# Patient Record
Sex: MALE | Race: WHITE | Employment: OTHER | URBAN - METROPOLITAN AREA
[De-identification: names, ages, dates, MRNs, and addresses within clinical notes are randomized per-mention and may not be internally consistent; named-entity substitution may affect disease eponyms.]

---

## 2022-05-18 ENCOUNTER — NURSING HOME VISIT (OUTPATIENT)
Dept: GERIATRICS | Facility: CLINIC | Age: 77
End: 2022-05-18
Payer: COMMERCIAL

## 2022-05-18 VITALS
HEART RATE: 83 BPM | SYSTOLIC BLOOD PRESSURE: 119 MMHG | RESPIRATION RATE: 20 BRPM | HEIGHT: 69 IN | WEIGHT: 184.4 LBS | BODY MASS INDEX: 27.31 KG/M2 | OXYGEN SATURATION: 96 % | TEMPERATURE: 97.3 F | DIASTOLIC BLOOD PRESSURE: 82 MMHG

## 2022-05-18 DIAGNOSIS — E11.69 TYPE 2 DIABETES MELLITUS WITH OTHER SPECIFIED COMPLICATION, WITHOUT LONG-TERM CURRENT USE OF INSULIN (H): ICD-10-CM

## 2022-05-18 DIAGNOSIS — I73.9 PAD (PERIPHERAL ARTERY DISEASE) (H): ICD-10-CM

## 2022-05-18 DIAGNOSIS — K59.01 SLOW TRANSIT CONSTIPATION: ICD-10-CM

## 2022-05-18 DIAGNOSIS — G47.33 OSA (OBSTRUCTIVE SLEEP APNEA): ICD-10-CM

## 2022-05-18 DIAGNOSIS — F10.20 ALCOHOL USE DISORDER, MODERATE, DEPENDENCE (H): ICD-10-CM

## 2022-05-18 DIAGNOSIS — I10 PRIMARY HYPERTENSION: ICD-10-CM

## 2022-05-18 DIAGNOSIS — M48.062 SPINAL STENOSIS OF LUMBAR REGION WITH NEUROGENIC CLAUDICATION: ICD-10-CM

## 2022-05-18 DIAGNOSIS — F10.96 WERNICKE-KORSAKOFF SYNDROME (ALCOHOLIC) (H): Primary | ICD-10-CM

## 2022-05-18 DIAGNOSIS — N18.30 STAGE 3 CHRONIC KIDNEY DISEASE, UNSPECIFIED WHETHER STAGE 3A OR 3B CKD (H): ICD-10-CM

## 2022-05-18 DIAGNOSIS — F43.10 PTSD (POST-TRAUMATIC STRESS DISORDER): ICD-10-CM

## 2022-05-18 DIAGNOSIS — G63 POLYNEUROPATHY ASSOCIATED WITH UNDERLYING DISEASE (H): ICD-10-CM

## 2022-05-18 DIAGNOSIS — F33.9 RECURRENT MAJOR DEPRESSIVE DISORDER, REMISSION STATUS UNSPECIFIED (H): ICD-10-CM

## 2022-05-18 PROCEDURE — 99305 1ST NF CARE MODERATE MDM 35: CPT | Performed by: INTERNAL MEDICINE

## 2022-05-18 RX ORDER — GABAPENTIN 300 MG/1
300 CAPSULE ORAL 2 TIMES DAILY
COMMUNITY

## 2022-05-18 RX ORDER — LANOLIN ALCOHOL/MO/W.PET/CERES
3 CREAM (GRAM) TOPICAL AT BEDTIME
COMMUNITY

## 2022-05-18 RX ORDER — MIRTAZAPINE 15 MG/1
15 TABLET, FILM COATED ORAL AT BEDTIME
COMMUNITY
End: 2023-09-15

## 2022-05-18 RX ORDER — SENNOSIDES 8.6 MG
2 TABLET ORAL DAILY PRN
COMMUNITY

## 2022-05-18 RX ORDER — CAPSAICIN 0.025 %
CREAM (GRAM) TOPICAL 3 TIMES DAILY
COMMUNITY
End: 2023-03-01

## 2022-05-18 RX ORDER — TRIAMCINOLONE ACETONIDE 1 MG/G
CREAM TOPICAL 2 TIMES DAILY
COMMUNITY

## 2022-05-18 RX ORDER — ASPIRIN 81 MG/1
81 TABLET ORAL DAILY
COMMUNITY

## 2022-05-18 RX ORDER — LANOLIN ALCOHOL/MO/W.PET/CERES
100 CREAM (GRAM) TOPICAL DAILY
COMMUNITY

## 2022-05-18 RX ORDER — LIDOCAINE 50 MG/G
2 PATCH TOPICAL EVERY 24 HOURS
COMMUNITY

## 2022-05-18 RX ORDER — LOSARTAN POTASSIUM 100 MG/1
100 TABLET ORAL DAILY
COMMUNITY

## 2022-05-18 RX ORDER — AMLODIPINE BESYLATE 2.5 MG/1
2.5 TABLET ORAL DAILY
COMMUNITY

## 2022-05-18 RX ORDER — ONDANSETRON 4 MG/1
4 TABLET, FILM COATED ORAL EVERY 6 HOURS PRN
COMMUNITY

## 2022-05-18 RX ORDER — VENLAFAXINE 50 MG/1
75 TABLET ORAL DAILY
COMMUNITY

## 2022-05-18 RX ORDER — ATORVASTATIN CALCIUM 40 MG/1
40 TABLET, FILM COATED ORAL AT BEDTIME
COMMUNITY
End: 2022-06-11

## 2022-05-18 RX ORDER — POLYETHYLENE GLYCOL 3350 17 G/17G
17 POWDER, FOR SOLUTION ORAL DAILY PRN
COMMUNITY

## 2022-05-18 NOTE — LETTER
5/18/2022        RE: Román Houser  [facility]  [address] [place] [address]        [facility] GERIATRICS  INITIAL VISIT NOTE  May 18, 2022    PRIMARY CARE PROVIDER AND CLINIC:  Lorie Cunningham [facility] [address] [facility] / [place]*    Chief Complaint   Patient presents with     [facility] [place] [facility]       HPI:    Román Houser is a 76 year old  (1945) male who was seen at [facility] on May 18, 2022 for an initial visit. Medical history is notable for HTN, DM II, PAD, PTSD, EtOH use disorder and infrarenal AAA among others. He was hospitalized at the [facility] from 4/22/22 to 5/5/22 where he presented with fairly abrupt onset of altered mental status. 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 He received IV thiamine and then changed to PO. PTA hydrochlorothiazide and glipizide were discontinued. He is new on gabapentin for peripheral neuropathy and a PPI for EtOH gastritis. Neuro recommended formal outpatient neuropsych testing. He was admitted to this facility for medical management and rehab and possibly LTC.     Today, Mr. Houser is seen in his room sitting up in bed. He said he's doing OK but he feels naked without his wallet and car keys in his pockets. He did not remember being in the hospital. He did not know why he is at [facility]. Talked about hospital stay, needing to get him stronger. He denies any aches or pains, no chest pain, no dyspnea, no abdominal pain. BIMS 5/15. No concerns today per discussion with nursing.     CODE STATUS:   CPR/Full code     ALLERGIES:   No Known Allergies    PAST MEDICAL HISTORY:   Past Medical History:   Diagnosis Date     AAA (abdominal aortic aneurysm) (H)      Bilateral carpal tunnel syndrome      Cataract      Depression      Epiretinal membrane      ETOH abuse      Hip pain      Psoriasis      PTSD  (post-traumatic stress disorder)      Sleep apnea        PAST SURGICAL HISTORY:   Past Surgical History:   Procedure Laterality Date     CE IOL LEFT EYE         FAMILY HISTORY:   Unable to review due to cognitive impairment. BIMS 5/15.     SOCIAL HISTORY:   Lives with a roommate     MEDICATIONS:  Post Discharge Medication Reconciliation Status: discharge medications reconciled and changed, per note/orders.   Current Outpatient Medications   Medication Sig Dispense Refill     amLODIPine (NORVASC) 2.5 MG tablet Take 2.5 mg by mouth daily       aspirin 81 MG EC tablet Take 81 mg by mouth daily       atorvastatin (LIPITOR) 40 MG tablet Take 40 mg by mouth At Bedtime       capsaicin (ZOSTRIX) 0.025 % external cream Apply topically 3 times daily APPLY TO LOWER BACK       diclofenac (VOLTAREN) 1 % topical gel Apply 2 g topically 4 times daily as needed for moderate pain APPLY TO BACK/LEGS       gabapentin (NEURONTIN) 300 MG capsule Take 300 mg by mouth 2 times daily       lidocaine (LIDODERM) 5 % patch Place 2 patches onto the skin every 24 hours To prevent lidocaine toxicity, patient should be patch free for 12 hrs daily. APPLY TO LOWER BACK       losartan (COZAAR) 100 MG tablet Take 100 mg by mouth daily       melatonin 3 MG tablet Take 3 mg by mouth At Bedtime       metFORMIN (GLUCOPHAGE) 1000 MG tablet Take 1,000 mg by mouth 2 times daily (with meals)       mirtazapine (REMERON) 15 MG tablet Take 15 mg by mouth At Bedtime       omeprazole (PRILOSEC) 20 MG DR capsule Take 20 mg by mouth 2 times daily       ondansetron (ZOFRAN) 4 MG tablet Take 4 mg by mouth every 6 hours as needed for nausea       polyethylene glycol (MIRALAX) 17 g packet Take 17 g by mouth daily as needed for constipation       sennosides (SENOKOT) 8.6 MG tablet Take 2 tablets by mouth 2 times daily as needed for constipation       thiamine (B-1) 100 MG tablet Take 100 mg by mouth daily       triamcinolone (KENALOG) 0.1 % external cream Apply topically  "2 times daily APPLY TO RIGHT KNEE       venlafaxine (EFFEXOR) 75 MG tablet Take 75 mg by mouth daily           ROS:  Unable to obtain due to cognitive impairment or aphasia. [facility] 5/15.     PHYSICAL EXAM:  /82   Pulse 83   Temp 97.3  F (36.3  C)   Resp 20   Ht 1.753 m (5' 9\")   Wt 83.6 kg (184 lb 6.4 oz)   SpO2 96%   BMI 27.23 kg/m     Gen: sitting up in bed, alert, cooperative and in no acute distress  Eyes: no scleral icterus, no conjunctival injection  Ears: good hearing acuity  Mouth: moist mucous membranes  Card: RRR, S1, S2, no murmurs  Resp: lungs clear to auscultation bilaterally, no crackles or wheezes   MSK: normal muscle tone, no LE edema  Neuro: CX II-XII grossly in tact; ROM in all four extremities grossly in tact  Psych: memory, judgement and insight impaired    LABORATORY/IMAGING DATA:  Reviewed as per Epic    ASSESSMENT/PLAN:    Werolenae-Korcornelius  EtOH Use Disorder  Clinical picture of anterograde amnesia plus imaging findings support the diagnosis. [facility] 5/15.   -- thiamine 100 m gdaily   [facility]    Et[facility] Induced Gastritis  GERD  New on a PPI with improvement in nausea  -- omeprazole 20 mg BID; can re-eval for daily in a few weeks    DM, Type II  Hgb A1c not available. Sugars not in PCC. Glipizide was discontinued during hospitalization.   -- metformin 1000 mg BID  -- should get a A1c with next labs     HTN  SBPs - no BP trend Hydrochlorothiazide was discontinued during hospitalization.   -- amlodipine 2.5 mg daily, losartan 100 mg daily   -- follow BPs and adjust medications as needed    Peripheral Neuropathy  Underlying DM and EtOH. New on gabapentin during hospitalization.     PAD  ABIs in 2017 with moderate disease  -- ASA 81 mg daily, atorvastatin 40 mg daily    Major Recurrent Depression  PTSD  Very pleasant today, quick with a smile   -- mirtazapine 15 mg at bedtime and venlafaxine 75 mg daily  -- supportive cares      Lumbar Spinal Stenosis w/ " Neurogenic Caludication  Chronic  -- capsaicin cream TID, diclofenac gel QID PRN, gabapentin 300 mg BID, lidoderm patch on q12h/off q12h,     Infrarenal AAA  [facility]. No details known  -- noted for clinical significance; if he stays long term should get more records on this    BETTY  By history. Not sure if he has a CPAP at home - [facility]  -- if possible to determine if he should have a CPAP    Slow Transit Constipation  -- Miralax 17g daily PRN and Senna 2 tabs BID PRN  -- adjust bowel regimen as needed      Electronically signed by:  Mona Davalos MD                          Sincerely,        Mona Davalos MD

## 2022-05-19 NOTE — PROGRESS NOTES
[facility] GERIATRICS  INITIAL VISIT NOTE  May 18, 2022    PRIMARY CARE PROVIDER AND CLINIC:  Lorie Cunningham [facility] [address] [facility] / [place]*    Chief Complaint   Patient presents with     \[facility] [place] [facility]\"" Care       HPI:    Román Houser is a 76 year old  (1945) male who was seen at [facility] on May 18, 2022 for an initial visit. Medical history is notable for HTN, DM II, PAD, PTSD, EtOH use disorder and infrarenal AAA among others. He was hospitalized at the [facility] from 4/22/22 to 5/5/22 where he presented with fairly abrupt onset of altered mental status. 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 He received IV thiamine and then changed to PO. PTA hydrochlorothiazide and glipizide were discontinued. He is new on gabapentin for peripheral neuropathy and a PPI for EtOH gastritis. Neuro recommended formal outpatient neuropsych testing. He was admitted to this facility for medical management and rehab and possibly LTC.     Today, Mr. Houser is seen in his room sitting up in bed. He said he's doing OK but he feels naked without his wallet and car keys in his pockets. He did not remember being in the hospital. He did not know why he is at [facility]. Talked about hospital stay, needing to get him stronger. He denies any aches or pains, no chest pain, no dyspnea, no abdominal pain. BIMS 5/15. No concerns today per discussion with nursing.     CODE STATUS:   CPR/Full code     ALLERGIES:   No Known Allergies    PAST MEDICAL HISTORY:   Past Medical History:   Diagnosis Date     AAA (abdominal aortic aneurysm) (H)      Bilateral carpal tunnel syndrome      Cataract      Depression      Epiretinal membrane      ETOH abuse      Hip pain      Psoriasis      PTSD (post-traumatic stress disorder)      Sleep apnea        PAST SURGICAL HISTORY:   Past Surgical History:    Procedure Laterality Date     CE IOL LEFT EYE         FAMILY HISTORY:   Unable to review due to cognitive impairment. BIMS 5/15.     SOCIAL HISTORY:   Lives with a roommate     MEDICATIONS:  Post Discharge Medication Reconciliation Status: discharge medications reconciled and changed, per note/orders.   Current Outpatient Medications   Medication Sig Dispense Refill     amLODIPine (NORVASC) 2.5 MG tablet Take 2.5 mg by mouth daily       aspirin 81 MG EC tablet Take 81 mg by mouth daily       atorvastatin (LIPITOR) 40 MG tablet Take 40 mg by mouth At Bedtime       capsaicin (ZOSTRIX) 0.025 % external cream Apply topically 3 times daily APPLY TO LOWER BACK       diclofenac (VOLTAREN) 1 % topical gel Apply 2 g topically 4 times daily as needed for moderate pain APPLY TO BACK/LEGS       gabapentin (NEURONTIN) 300 MG capsule Take 300 mg by mouth 2 times daily       lidocaine (LIDODERM) 5 % patch Place 2 patches onto the skin every 24 hours To prevent lidocaine toxicity, patient should be patch free for 12 hrs daily. APPLY TO LOWER BACK       losartan (COZAAR) 100 MG tablet Take 100 mg by mouth daily       melatonin 3 MG tablet Take 3 mg by mouth At Bedtime       metFORMIN (GLUCOPHAGE) 1000 MG tablet Take 1,000 mg by mouth 2 times daily (with meals)       mirtazapine (REMERON) 15 MG tablet Take 15 mg by mouth At Bedtime       omeprazole (PRILOSEC) 20 MG DR capsule Take 20 mg by mouth 2 times daily       ondansetron (ZOFRAN) 4 MG tablet Take 4 mg by mouth every 6 hours as needed for nausea       polyethylene glycol (MIRALAX) 17 g packet Take 17 g by mouth daily as needed for constipation       sennosides (SENOKOT) 8.6 MG tablet Take 2 tablets by mouth 2 times daily as needed for constipation       thiamine (B-1) 100 MG tablet Take 100 mg by mouth daily       triamcinolone (KENALOG) 0.1 % external cream Apply topically 2 times daily APPLY TO RIGHT KNEE       venlafaxine (EFFEXOR) 75 MG tablet Take 75 mg by mouth daily    "        ROS:  Unable to obtain due to cognitive impairment or aphasia. [facility] 5/15.     PHYSICAL EXAM:  /82   Pulse 83   Temp 97.3  F (36.3  C)   Resp 20   Ht 1.753 m (5' 9\")   Wt 83.6 kg (184 lb 6.4 oz)   SpO2 96%   BMI 27.23 kg/m     Gen: sitting up in bed, alert, cooperative and in no acute distress  Eyes: no scleral icterus, no conjunctival injection  Ears: good hearing acuity  Mouth: moist mucous membranes  Card: RRR, S1, S2, no murmurs  Resp: lungs clear to auscultation bilaterally, no crackles or wheezes   MSK: normal muscle tone, no LE edema  Neuro: CX II-XII grossly in tact; ROM in all four extremities grossly in tact  Psych: memory, judgement and insight impaired    LABORATORY/IMAGING DATA:  Reviewed as per Epic    ASSESSMENT/PLAN:    Werolenae-Korsakogoldie  EtOH Use Disorder  Clinical picture of anterograde amnesia plus imaging findings support the diagnosis. [facility] 5/15.   -- thiamine 100 m gdaily   [facility]    EtOH Induced Gastritis  GERD  New on a PPI with improvement in nausea  -- omeprazole 20 mg BID; can re-eval for daily in a few weeks    DM, Type II  Hgb A1c not available. Sugars not in PCC. Glipizide was discontinued during hospitalization.   -- metformin 1000 mg BID  -- should get a A1c with next labs     HTN  SBPs - no BP trend Hydrochlorothiazide was discontinued during hospitalization.   -- amlodipine 2.5 mg daily, losartan 100 mg daily   -- follow BPs and adjust medications as needed    Peripheral Neuropathy  Underlying DM and EtOH. New on gabapentin during hospitalization.     PAD  ABIs in 2017 with moderate disease  -- ASA 81 mg daily, atorvastatin 40 mg daily    Major Recurrent Depression  PTSD  Very pleasant today, quick with a smile   -- mirtazapine 15 mg at bedtime and venlafaxine 75 mg daily  -- supportive cares      Lumbar Spinal Stenosis w/ Neurogenic Caludication  Chronic  -- capsaicin cream TID, diclofenac gel QID PRN, gabapentin 300 mg BID, " lidoderm patch on q12h/off q12h,     Infrarenal AAA  [facility]. No details known  -- noted for clinical significance; if he stays long term should get more records on this    BETTY  By history. Not sure if he has a CPAP at home - [facility]  -- if possible to determine if he should have a CPAP    Slow Transit Constipation  -- Miralax 17g daily PRN and Senna 2 tabs BID PRN  -- adjust bowel regimen as needed      Electronically signed by:  Mona Davalos MD

## 2022-06-03 ENCOUNTER — NURSING HOME VISIT (OUTPATIENT)
Dept: GERIATRICS | Facility: CLINIC | Age: 77
End: 2022-06-03
Payer: COMMERCIAL

## 2022-06-03 VITALS
SYSTOLIC BLOOD PRESSURE: 116 MMHG | TEMPERATURE: 97 F | DIASTOLIC BLOOD PRESSURE: 80 MMHG | WEIGHT: 183.4 LBS | BODY MASS INDEX: 27.16 KG/M2 | OXYGEN SATURATION: 97 % | RESPIRATION RATE: 18 BRPM | HEIGHT: 69 IN | HEART RATE: 80 BPM

## 2022-06-03 DIAGNOSIS — N18.30 STAGE 3 CHRONIC KIDNEY DISEASE, UNSPECIFIED WHETHER STAGE 3A OR 3B CKD (H): Primary | ICD-10-CM

## 2022-06-03 DIAGNOSIS — I10 PRIMARY HYPERTENSION: ICD-10-CM

## 2022-06-03 DIAGNOSIS — E11.69 TYPE 2 DIABETES MELLITUS WITH OTHER SPECIFIED COMPLICATION, WITHOUT LONG-TERM CURRENT USE OF INSULIN (H): ICD-10-CM

## 2022-06-03 DIAGNOSIS — F10.96 WERNICKE-KORSAKOFF SYNDROME (ALCOHOLIC) (H): ICD-10-CM

## 2022-06-03 DIAGNOSIS — F10.20 ALCOHOL USE DISORDER, MODERATE, DEPENDENCE (H): ICD-10-CM

## 2022-06-03 PROCEDURE — 99309 SBSQ NF CARE MODERATE MDM 30: CPT | Performed by: NURSE PRACTITIONER

## 2022-06-03 NOTE — PROGRESS NOTES
"[facility] GERIATRICS  Chief Complaint   Patient presents with     [facility] Regulatory     [place] Medical Record Number:  [id number]  Place of Service where encounter took place:  [facility] () [[id number]]    HPI:    Román Houser  is 76 year old (1945), who is being seen today for a federally mandated E/M visit. Today's concerns are:     Stage 3 chronic kidney disease, unspecified whether stage 3a or 3b CKD (H)  Type 2 diabetes mellitus with other specified complication, without long-term current use of insulin (H)  Wernicke-Korsakoff syndrome (alcoholic) (H)  Alcohol use disorder, moderate, dependence (H)  Primary hypertension     Patient seen today for a Regulatory visit in [facility]. Patient notes he's doing \"pretty good\". Would like to know when he will be able to \"get out of here\" - I tell him for now we need to focus and making sure he is healthy and strong - he is in agreement with this plan. Appetite is good. He is sleeping well. Denies CP, palpitations, fatigue, nausea, vomiting, increased SOB/HUANG, fever, chills, and/or b/b concerns today.    ALLERGIES:Patient has no known allergies.  PAST MEDICAL HISTORY:   Past Medical History:   Diagnosis Date     AAA (abdominal aortic aneurysm) (H)      Bilateral carpal tunnel syndrome      Cataract      Depression      Epiretinal membrane      ETOH abuse      Hip pain      Psoriasis      PTSD (post-traumatic stress disorder)      Sleep apnea      PAST SURGICAL HISTORY:   has a past surgical history that includes CE IOL LEFT EYE.  FAMILY HISTORY: family history is not on file.  SOCIAL HISTORY:  reports that he has quit smoking. He does not have any smokeless tobacco history on file.    MEDICATIONS:     Review of your medicines          Accurate as of Shira 3, 2022  1:18 PM. If you have any questions, ask your nurse or doctor.            CONTINUE these medicines which have NOT CHANGED      Dose / Directions   amLODIPine 2.5 MG tablet  Commonly " known as: NORVASC      Dose: 2.5 mg  Take 2.5 mg by mouth daily  Refills: 0     aspirin 81 MG EC tablet      Dose: 81 mg  Take 81 mg by mouth daily  Refills: 0     atorvastatin 40 MG tablet  Commonly known as: LIPITOR      Dose: 40 mg  Take 40 mg by mouth At Bedtime  Refills: 0     capsaicin 0.025 % external cream  Commonly known as: ZOSTRIX      Apply topically 3 times daily APPLY TO LOWER BACK  Refills: 0     diclofenac 1 % topical gel  Commonly known as: VOLTAREN      Dose: 2 g  Apply 2 g topically 4 times daily as needed for moderate pain APPLY TO BACK/LEGS  Refills: 0     gabapentin 300 MG capsule  Commonly known as: NEURONTIN      Dose: 300 mg  Take 300 mg by mouth 2 times daily  Refills: 0     lidocaine 5 % patch  Commonly known as: LIDODERM      Dose: 2 patch  Place 2 patches onto the skin every 24 hours To prevent lidocaine toxicity, patient should be patch free for 12 hrs daily. APPLY TO LOWER BACK  Refills: 0     losartan 100 MG tablet  Commonly known as: COZAAR      Dose: 100 mg  Take 100 mg by mouth daily  Refills: 0     melatonin 3 MG tablet      Dose: 3 mg  Take 3 mg by mouth At Bedtime  Refills: 0     metFORMIN 1000 MG tablet  Commonly known as: GLUCOPHAGE      Dose: 1,000 mg  Take 1,000 mg by mouth 2 times daily (with meals)  Refills: 0     mirtazapine 15 MG tablet  Commonly known as: REMERON      Dose: 15 mg  Take 15 mg by mouth At Bedtime  Refills: 0     omeprazole 20 MG DR capsule  Commonly known as: priLOSEC      Dose: 20 mg  Take 20 mg by mouth 2 times daily  Refills: 0     ondansetron 4 MG tablet  Commonly known as: ZOFRAN      Dose: 4 mg  Take 4 mg by mouth every 6 hours as needed for nausea  Refills: 0     polyethylene glycol 17 g packet  Commonly known as: MIRALAX      Dose: 17 g  Take 17 g by mouth daily as needed for constipation  Refills: 0     sennosides 8.6 MG tablet  Commonly known as: SENOKOT      Dose: 2 tablet  Take 2 tablets by mouth 2 times daily as needed for  "constipation  Refills: 0     thiamine 100 MG tablet  Commonly known as: B-1      Dose: 100 mg  Take 100 mg by mouth daily  Refills: 0     triamcinolone 0.1 % external cream  Commonly known as: KENALOG      Apply topically 2 times daily APPLY TO RIGHT KNEE  Refills: 0     venlafaxine 75 MG tablet  Commonly known as: EFFEXOR      Dose: 75 mg  Take 75 mg by mouth daily  Refills: 0           Case Management:  I have reviewed the care plan and MDS and do agree with the plan. Patient's desire to return to the community is present, but is not able due to care needs . Information reviewed:  Medications, vital signs, orders, and nursing notes.    ROS:  10 point ROS of systems including Constitutional, Eyes, Respiratory, Cardiovascular, Gastroenterology, Genitourinary, Integumentary, Musculoskeletal, Psychiatric were all negative except for pertinent positives noted in my HPI.    Vitals:  /80   Pulse 80   Temp 97  F (36.1  C)   Resp 18   Ht 1.753 m (5' 9\")   Wt 83.2 kg (183 lb 6.4 oz)   SpO2 97%   BMI 27.08 kg/m    Body mass index is 27.08 kg/m .  Exam:  GENERAL APPEARANCE:  Alert, in no distress, appears healthy, cooperative, elderly male ambulating on unit  EYES:  EOM, conjunctivae, lids, pupils and irises normal, wears glasses  RESP:  respiratory effort and palpation of chest normal, lungs clear to auscultation , no respiratory distress  CV:  Palpation and auscultation of heart done , regular rate and rhythm, no murmur, rub, or gallop  ABDOMEN:  normal bowel sounds, soft, nontender, no hepatosplenomegaly or other masses  M/S:   Gait and station normal  Digits and nails abnormal - arthritic changes present  SKIN:  Inspection of skin and subcutaneous tissue baseline, Palpation of skin and subcutaneous tissue baseline, skin thin, fragile and dry  NEURO:   Cranial nerves 2-12 are normal tested and grossly at patient's baseline  PSYCH:  oriented to 1-2, insight and judgement impaired, memory impaired , affect and " mood normal    Lab/Diagnostic data:   [facility]    ASSESSMENT/PLAN    ICD-10-CM    1. Stage 3 chronic kidney disease, unspecified whether stage 3a or 3b CKD (H)  N18.30 Chronic - unstable? Difficult to determine status given limitation on records. Will recheck CMP at this time and continue to avoid nephrotoxic agents.   2. Type 2 diabetes mellitus with other specified complication, without long-term current use of insulin (H)  E11.69 Chronic - stable? Suspect stability with discontinuation of Glipizide inpatient. Continues on regimen of Metformin. Will continue medications as ordered at this time with recheck of A1c.   3. Wernicke-Korsakoff syndrome (alcoholic) (H)  F10.96 Chronic - unstable. Notable decline PTA as noted in Dr Davalos's admission note. Continues on   4. Alcohol use disorder, moderate, dependence (H)  F10.20 Regimen of Thiamine, along with Remeron and Effexor. Last PHQ9 - 01/27; BIMs - 06/15. Is currently residing on secure MCU.  Will continue medications, placement and current POC at this time as they remain appropriate. Recheck CMP as noted above.     5. Primary hypertension  I10 Chronic - stable. Limited BPs as noted below. Continues on regimen of Losartan and Norvasc. Will continue medications as ordered with request for weekly VS as per facility protocol.       Baseline labs also needed:  CBC  FLP    Electronically signed by:  Dr. Racquel Cunningham, APRN, DNP, AGNP-BC, PMHNP-BC  [facility]  Office Hours: Tues-Fri 0930-8007  Office: [address] [place] [address]  Fax - [phone]  Triage Phone- [phone]  Business Office- [phone]      Email: [email or web address]

## 2022-06-09 ENCOUNTER — LAB REQUISITION (OUTPATIENT)
Dept: LAB | Facility: CLINIC | Age: 77
End: 2022-06-09

## 2022-06-09 DIAGNOSIS — N18.9 CHRONIC KIDNEY DISEASE, UNSPECIFIED: ICD-10-CM

## 2022-06-09 DIAGNOSIS — E11.9 TYPE 2 DIABETES MELLITUS WITHOUT COMPLICATIONS (H): ICD-10-CM

## 2022-06-09 DIAGNOSIS — E78.5 HYPERLIPIDEMIA, UNSPECIFIED: ICD-10-CM

## 2022-06-09 DIAGNOSIS — D64.9 ANEMIA, UNSPECIFIED: ICD-10-CM

## 2022-06-10 ENCOUNTER — TELEPHONE (OUTPATIENT)
Dept: GERIATRICS | Facility: CLINIC | Age: 77
End: 2022-06-10

## 2022-06-10 LAB
ALBUMIN SERPL-MCNC: 4 G/DL (ref 3.5–5)
ALP SERPL-CCNC: 103 U/L (ref 45–120)
ALT SERPL W P-5'-P-CCNC: 31 U/L (ref 0–45)
ANION GAP SERPL CALCULATED.3IONS-SCNC: 11 MMOL/L (ref 5–18)
AST SERPL W P-5'-P-CCNC: 20 U/L (ref 0–40)
BILIRUB SERPL-MCNC: 0.6 MG/DL (ref 0–1)
BUN SERPL-MCNC: 23 MG/DL (ref 8–28)
CALCIUM SERPL-MCNC: 10 MG/DL (ref 8.5–10.5)
CHLORIDE BLD-SCNC: 102 MMOL/L (ref 98–107)
CHOLEST SERPL-MCNC: 132 MG/DL
CO2 SERPL-SCNC: 27 MMOL/L (ref 22–31)
CREAT SERPL-MCNC: 1.14 MG/DL (ref 0.7–1.3)
ERYTHROCYTE [DISTWIDTH] IN BLOOD BY AUTOMATED COUNT: 13.2 % (ref 10–15)
FASTING STATUS PATIENT QL REPORTED: ABNORMAL
GFR SERPL CREATININE-BSD FRML MDRD: 66 ML/MIN/1.73M2
GLUCOSE BLD-MCNC: 133 MG/DL (ref 70–125)
HBA1C MFR BLD: 7.3 %
HCT VFR BLD AUTO: 45.4 % (ref 40–53)
HDLC SERPL-MCNC: 29 MG/DL
HGB BLD-MCNC: 14.6 G/DL (ref 13.3–17.7)
LDLC SERPL CALC-MCNC: 40 MG/DL
MCH RBC QN AUTO: 31.9 PG (ref 26.5–33)
MCHC RBC AUTO-ENTMCNC: 32.2 G/DL (ref 31.5–36.5)
MCV RBC AUTO: 99 FL (ref 78–100)
PLATELET # BLD AUTO: 265 10E3/UL (ref 150–450)
POTASSIUM BLD-SCNC: 4.7 MMOL/L (ref 3.5–5)
PROT SERPL-MCNC: 7.8 G/DL (ref 6–8)
RBC # BLD AUTO: 4.57 10E6/UL (ref 4.4–5.9)
SODIUM SERPL-SCNC: 140 MMOL/L (ref 136–145)
TRIGL SERPL-MCNC: 313 MG/DL
WBC # BLD AUTO: 7 10E3/UL (ref 4–11)

## 2022-06-10 PROCEDURE — P9604 ONE-WAY ALLOW PRORATED TRIP: HCPCS | Performed by: NURSE PRACTITIONER

## 2022-06-10 PROCEDURE — 80053 COMPREHEN METABOLIC PANEL: CPT | Performed by: NURSE PRACTITIONER

## 2022-06-10 PROCEDURE — 83036 HEMOGLOBIN GLYCOSYLATED A1C: CPT | Performed by: NURSE PRACTITIONER

## 2022-06-10 PROCEDURE — 85027 COMPLETE CBC AUTOMATED: CPT | Performed by: NURSE PRACTITIONER

## 2022-06-10 PROCEDURE — 36415 COLL VENOUS BLD VENIPUNCTURE: CPT | Performed by: NURSE PRACTITIONER

## 2022-06-10 PROCEDURE — 80061 LIPID PANEL: CPT | Performed by: NURSE PRACTITIONER

## 2022-06-10 PROCEDURE — 82040 ASSAY OF SERUM ALBUMIN: CPT | Performed by: NURSE PRACTITIONER

## 2022-06-10 NOTE — TELEPHONE ENCOUNTER
[facility] Note     Provider: LONG Childers DNP  Facility: [facility] Facility Type:  LTC    No Known Allergies    Labs Reviewed by provider: Heme 2, CMP, lipid, HgbA1c     Verbal Order/Direction given by Provider: No new orders.      Provider giving Order:  TYSHAWN Christian    Verbal Order given to: Andreas([phone])    Gomez Lowery RN

## 2022-06-11 DIAGNOSIS — E78.2 MIXED HYPERLIPIDEMIA: Primary | ICD-10-CM

## 2022-06-11 RX ORDER — ATORVASTATIN CALCIUM 20 MG/1
50 TABLET, FILM COATED ORAL AT BEDTIME
Qty: 78 TABLET | Refills: 2 | Status: SHIPPED | OUTPATIENT
Start: 2022-06-11 | End: 2023-01-23 | Stop reason: ALTCHOICE

## 2022-06-11 NOTE — PROGRESS NOTES
Orders:    Increase Atorvastatin to 50mg PO at bedtime     Recheck FLP on 7/22/22  o Dx HLD    Dr. Racquel Cunningham, APRN, DNP, AGNP-BC, PMHNP-BC  [facility]  Office Hours: Tues-Fri 2411-5031  Office: [address] [place] [address]  Fax - [phone]  Triage Phone- [phone]  Business Office- [phone]      Email: [email or web address]

## 2022-07-12 ENCOUNTER — NURSING HOME VISIT (OUTPATIENT)
Dept: GERIATRICS | Facility: CLINIC | Age: 77
End: 2022-07-12
Payer: COMMERCIAL

## 2022-07-12 VITALS
BODY MASS INDEX: 27.43 KG/M2 | OXYGEN SATURATION: 96 % | RESPIRATION RATE: 18 BRPM | WEIGHT: 185.2 LBS | TEMPERATURE: 97.6 F | DIASTOLIC BLOOD PRESSURE: 89 MMHG | HEART RATE: 82 BPM | HEIGHT: 69 IN | SYSTOLIC BLOOD PRESSURE: 163 MMHG

## 2022-07-12 DIAGNOSIS — F33.9 RECURRENT MAJOR DEPRESSIVE DISORDER, REMISSION STATUS UNSPECIFIED (H): ICD-10-CM

## 2022-07-12 DIAGNOSIS — I10 PRIMARY HYPERTENSION: ICD-10-CM

## 2022-07-12 DIAGNOSIS — F10.96 WERNICKE-KORSAKOFF SYNDROME (ALCOHOLIC) (H): ICD-10-CM

## 2022-07-12 DIAGNOSIS — E11.69 TYPE 2 DIABETES MELLITUS WITH OTHER SPECIFIED COMPLICATION, WITHOUT LONG-TERM CURRENT USE OF INSULIN (H): Primary | ICD-10-CM

## 2022-07-12 PROCEDURE — 99309 SBSQ NF CARE MODERATE MDM 30: CPT | Performed by: INTERNAL MEDICINE

## 2022-07-12 NOTE — PROGRESS NOTES
[facility] GERIATRICS  REGULATORY VISIT  July 12, 2022    [facility] Medical Record Number:  [id number]  Place of Service where encounter took place:  [facility] () [[id number]]    Chief Complaint   Patient presents with     intermediate Regulatory       HPI:    Román Houser is a 77 year old  (1945), who is being seen today for a federally mandated E/M visit at [facility] where he has resided since May 2022. HPI information obtained from: facility chart records, facility staff, patient report and [facility] chart review.    Today's concern is:  Today. Mr. Houser is seen prior to going outside for a group activity. He ambulates with a cane, though seems to use it little as gait looks pretty steady. [facility], but he doesn't know what for. No chest pain. No trouble breathing. No concerns today per discussion with nursing.     ALLERGIES:  No Known Allergies     Past Medical, Surgical, Family and Social History: Reviewed and updated in EPIC.    MEDICATIONS:  Current Outpatient Medications   Medication Sig Dispense Refill     amLODIPine (NORVASC) 2.5 MG tablet Take 2.5 mg by mouth daily       aspirin 81 MG EC tablet Take 81 mg by mouth daily       atorvastatin (LIPITOR) 20 MG tablet Take 2.5 tablets (50 mg) by mouth At Bedtime 78 tablet 2     capsaicin (ZOSTRIX) 0.025 % external cream Apply topically 3 times daily APPLY TO LOWER BACK       diclofenac (VOLTAREN) 1 % topical gel Apply 2 g topically 4 times daily as needed for moderate pain APPLY TO BACK/LEGS       gabapentin (NEURONTIN) 300 MG capsule Take 300 mg by mouth 2 times daily       lidocaine (LIDODERM) 5 % patch Place 2 patches onto the skin every 24 hours To prevent lidocaine toxicity, patient should be patch free for 12 hrs daily. APPLY TO LOWER BACK       losartan (COZAAR) 100 MG tablet Take 100 mg by mouth daily       melatonin 3 MG tablet Take 3 mg by mouth At Bedtime        "metFORMIN (GLUCOPHAGE) 1000 MG tablet Take 1,000 mg by mouth 2 times daily (with meals)       mirtazapine (REMERON) 15 MG tablet Take 15 mg by mouth At Bedtime       omeprazole (PRILOSEC) 20 MG DR capsule Take 20 mg by mouth 2 times daily       ondansetron (ZOFRAN) 4 MG tablet Take 4 mg by mouth every 6 hours as needed for nausea       polyethylene glycol (MIRALAX) 17 g packet Take 17 g by mouth daily as needed for constipation       sennosides (SENOKOT) 8.6 MG tablet Take 2 tablets by mouth 2 times daily as needed for constipation       thiamine (B-1) 100 MG tablet Take 100 mg by mouth daily       triamcinolone (KENALOG) 0.1 % external cream Apply topically 2 times daily APPLY TO RIGHT KNEE       venlafaxine (EFFEXOR) 75 MG tablet Take 75 mg by mouth daily       Medications reviewed:  Medications reconciled to facility chart and changes were made to reflect current medications as identified as above med list. Below are the changes that were made:   Medications stopped since last EPIC medication reconciliation:   There are no discontinued medications.    Medications started since last [facility] medication reconciliation:  No orders of the defined types were placed in this encounter.    REVIEW OF SYSTEMS:  Unable to be obtained due to cognitive impairment or aphasia. [facility] 8/15    PHYSICAL EXAM:  BP (!) 163/89   Pulse 82   Temp 97.6  F (36.4  C)   Resp 18   Ht 1.753 m (5' 9\")   Wt 84 kg (185 lb 3.2 oz)   SpO2 96%   BMI 27.35 kg/m    Gen: standing with his cane, alert, cooperative and in no acute distress  Resp: breathing non labored, no tachypnea  Ext: no LE edema  Neuro: CX II-XII grossly in tact; ROM in all four extremities grossly in tact  Psych: memory, judgement and insight impaired    LABS/IMAGING: Reviewed as per Epic and/or [facility]    ASSESSMENT / PLAN:    DM, Type II  Hgb A1c 7.3 in June. Goal <8. Sugars not in PCC.   -- metformin 1000 mg BID  -- annual A1c per goals of " care    WernikjermanEmily  EtOH Use Disorder  Major Recurrent Depression  Clinical picture of anterograde amnesia plus imaging findings support the diagnosis. [facility] 8/15.   -- thiamine 100 mg daily   -- mirtazapine 15 mg at bedtime and venlafaxine 75 mg daily  -- [facility]    HTN  SBPs 130s. Hydrochlorothiazide was discontinued during hospitalization this spring.  [facility] [place] in June.   -- amlodipine 2.5 mg daily, losartan 100 mg daily   -- follow BPs and adjust medications as needed    Electronically signed by  Mona Davalos MD

## 2022-07-12 NOTE — LETTER
7/12/2022        RE: Román Houser  [facility]  [address]  [facility] [address]        [facility] GERIATRICS  REGULATORY VISIT  July 12, 2022    [facility] Medical Record Number:  [id number]  Place of Service where encounter took place:  [facility] () [[id number]]    Chief Complaint   Patient presents with     intermediate Regulatory       HPI:    Román Houser is a 77 year old  (1945), who is being seen today for a federally mandated E/M visit at [facility] where he has resided since May 2022. HPI information obtained from: facility chart records, facility staff, patient report and [facility] chart review.    Today's concern is:  Today. Mr. Houser is seen prior to going outside for a group activity. He ambulates with a cane, though seems to use it little as gait looks pretty steady. [facility], but he doesn't know what for. No chest pain. No trouble breathing. No concerns today per discussion with nursing.     ALLERGIES:  No Known Allergies     Past Medical, Surgical, Family and Social History: Reviewed and updated in EPIC.    MEDICATIONS:  Current Outpatient Medications   Medication Sig Dispense Refill     amLODIPine (NORVASC) 2.5 MG tablet Take 2.5 mg by mouth daily       aspirin 81 MG EC tablet Take 81 mg by mouth daily       atorvastatin (LIPITOR) 20 MG tablet Take 2.5 tablets (50 mg) by mouth At Bedtime 78 tablet 2     capsaicin (ZOSTRIX) 0.025 % external cream Apply topically 3 times daily APPLY TO LOWER BACK       diclofenac (VOLTAREN) 1 % topical gel Apply 2 g topically 4 times daily as needed for moderate pain APPLY TO BACK/LEGS       gabapentin (NEURONTIN) 300 MG capsule Take 300 mg by mouth 2 times daily       lidocaine (LIDODERM) 5 % patch Place 2 patches onto the skin every 24 hours To prevent lidocaine toxicity, patient should be patch free for 12 hrs daily. APPLY TO LOWER BACK       losartan  "(COZAAR) 100 MG tablet Take 100 mg by mouth daily       melatonin 3 MG tablet Take 3 mg by mouth At Bedtime       metFORMIN (GLUCOPHAGE) 1000 MG tablet Take 1,000 mg by mouth 2 times daily (with meals)       mirtazapine (REMERON) 15 MG tablet Take 15 mg by mouth At Bedtime       omeprazole (PRILOSEC) 20 MG DR capsule Take 20 mg by mouth 2 times daily       ondansetron (ZOFRAN) 4 MG tablet Take 4 mg by mouth every 6 hours as needed for nausea       polyethylene glycol (MIRALAX) 17 g packet Take 17 g by mouth daily as needed for constipation       sennosides (SENOKOT) 8.6 MG tablet Take 2 tablets by mouth 2 times daily as needed for constipation       thiamine (B-1) 100 MG tablet Take 100 mg by mouth daily       triamcinolone (KENALOG) 0.1 % external cream Apply topically 2 times daily APPLY TO RIGHT KNEE       venlafaxine (EFFEXOR) 75 MG tablet Take 75 mg by mouth daily       Medications reviewed:  Medications reconciled to facility chart and changes were made to reflect current medications as identified as above med list. Below are the changes that were made:   Medications stopped since last EPIC medication reconciliation:   There are no discontinued medications.    Medications started since last [facility] medication reconciliation:  No orders of the defined types were placed in this encounter.    REVIEW OF SYSTEMS:  Unable to be obtained due to cognitive impairment or aphasia. [facility] 8/15    PHYSICAL EXAM:  BP (!) 163/89   Pulse 82   Temp 97.6  F (36.4  C)   Resp 18   Ht 1.753 m (5' 9\")   Wt 84 kg (185 lb 3.2 oz)   SpO2 96%   BMI 27.35 kg/m    Gen: standing with his cane, alert, cooperative and in no acute distress  Resp: breathing non labored, no tachypnea  Ext: no LE edema  Neuro: CX II-XII grossly in tact; ROM in all four extremities grossly in tact  Psych: memory, judgement and insight impaired    LABS/IMAGING: Reviewed as per Epic and/or [facility]    ASSESSMENT / PLAN:    DM, Type II  Hgb A1c 7.3 in June. " Goal <8. Sugars not in PCC.   -- metformin 1000 mg BID  -- annual A1c per goals of care    WerMoises  EtOH Use Disorder  Major Recurrent Depression  Clinical picture of anterograde amnesia plus imaging findings support the diagnosis. [facility] 8/15.   -- thiamine 100 mg daily   -- mirtazapine 15 mg at bedtime and venlafaxine 75 mg daily  -- [facility]    HTN  SBPs 130s. Hydrochlorothiazide was discontinued during hospitalization this spring.  [facility] [place] in June.   -- amlodipine 2.5 mg daily, losartan 100 mg daily   -- follow BPs and adjust medications as needed    Electronically signed by  Mona Davalos MD             Sincerely,        Mona Davalos MD

## 2022-07-16 PROBLEM — E11.9 DIABETES MELLITUS, TYPE 2 (H): Status: ACTIVE | Noted: 2022-07-16

## 2022-07-20 ENCOUNTER — LAB REQUISITION (OUTPATIENT)
Dept: LAB | Facility: CLINIC | Age: 77
End: 2022-07-20

## 2022-07-20 DIAGNOSIS — E78.5 HYPERLIPIDEMIA, UNSPECIFIED: ICD-10-CM

## 2022-07-22 LAB
CHOLEST SERPL-MCNC: 134 MG/DL
HDLC SERPL-MCNC: 30 MG/DL
LDLC SERPL CALC-MCNC: 49 MG/DL
NONHDLC SERPL-MCNC: 104 MG/DL
TRIGL SERPL-MCNC: 273 MG/DL

## 2022-07-22 PROCEDURE — P9604 ONE-WAY ALLOW PRORATED TRIP: HCPCS | Performed by: NURSE PRACTITIONER

## 2022-07-22 PROCEDURE — 36415 COLL VENOUS BLD VENIPUNCTURE: CPT | Performed by: NURSE PRACTITIONER

## 2022-07-22 PROCEDURE — 83718 ASSAY OF LIPOPROTEIN: CPT | Performed by: NURSE PRACTITIONER

## 2022-09-20 VITALS
RESPIRATION RATE: 18 BRPM | TEMPERATURE: 97.4 F | BODY MASS INDEX: 28.29 KG/M2 | WEIGHT: 191.6 LBS | HEART RATE: 88 BPM | OXYGEN SATURATION: 95 % | DIASTOLIC BLOOD PRESSURE: 78 MMHG | SYSTOLIC BLOOD PRESSURE: 134 MMHG

## 2022-09-20 RX ORDER — ACETAMINOPHEN 500 MG
1000 TABLET ORAL 2 TIMES DAILY
COMMUNITY

## 2022-09-20 NOTE — PROGRESS NOTES
[facility] GERIATRICS  Chief Complaint   Patient presents with     Annual Comprehensive Nursing Home     [facility] Medical Record Number:  [id number]  Place of Service where encounter took place:  No question data found.    HPI:    Román Houser  is a 77 year old  (1945), who is being seen today for an annual comprehensive visit.  He has with past medical history AAA, depression, alcohol abuse, PTSD, sleep apnea    Today is seen to review vital signs, labs, and a routine annual visit.  He denied chest pain shortness of breath cough congestion constipation or diarrhea.  Labs reviewed last CBC and BMP within normal limits last hemoglobin 14.6.  However his triglycerides are elevated at 273 which is slightly down from 313 prior he is on 50 mg of atorvastatin.  Last A1c 7.3 on 6/10/2022.  His weights were reviewed he has been stable over the last month.  He had no concerns. And denied pain    ALLERGIES: Patient has no known allergies.  PAST MEDICAL HISTORY:   Past Medical History:   Diagnosis Date     AAA (abdominal aortic aneurysm) (H)      Bilateral carpal tunnel syndrome      Cataract      Depression      Epiretinal membrane      ETOH abuse      Hip pain      Psoriasis      PTSD (post-traumatic stress disorder)      Sleep apnea       PAST SURGICAL HISTORY:  has a past surgical history that includes CE IOL LEFT EYE.  IMMUNIZATIONS:  Immunization History   Administered Date(s) Administered     COVID-19,PF,Pfizer (12+ Yrs) 02/24/2021, 03/15/2021, 10/01/2021     Influenza Vaccine IM > 6 months Valent IIV4 (Alfuria,Fluzone) 10/08/2020, 10/01/2021     Pneumo Conj 13-V (2010&after) 01/28/2016     Pneumococcal, Unspecified 11/08/2000, 10/14/2010     Tdap (Adacel,Boostrix) 01/21/2014, 05/04/2019     Zoster vaccine recombinant adjuvanted (SHINGRIX) 01/09/2019, 06/05/2019, 02/26/2020     Zoster vaccine, live 07/24/2012     Above immunizations pulled from [facility]. MIIC and facility records also  reconciled.Future immunizations are not needed at this point as all recommended immunizations are up to date.       Current Outpatient Medications:      acetaminophen (TYLENOL) 500 MG tablet, Take 1,000 mg by mouth 2 times daily, Disp: , Rfl:      amLODIPine (NORVASC) 2.5 MG tablet, Take 2.5 mg by mouth daily, Disp: , Rfl:      aspirin 81 MG EC tablet, Take 81 mg by mouth daily, Disp: , Rfl:      atorvastatin (LIPITOR) 20 MG tablet, Take 2.5 tablets (50 mg) by mouth At Bedtime, Disp: 78 tablet, Rfl: 2     capsaicin (ZOSTRIX) 0.025 % external cream, Apply topically 3 times daily APPLY TO LOWER BACK, Disp: , Rfl:      diclofenac (VOLTAREN) 1 % topical gel, Apply 2 g topically 4 times daily as needed for moderate pain APPLY TO BACK/LEGS, Disp: , Rfl:      gabapentin (NEURONTIN) 300 MG capsule, Take 300 mg by mouth 2 times daily, Disp: , Rfl:      lidocaine (LIDODERM) 5 % patch, Place 2 patches onto the skin every 24 hours To prevent lidocaine toxicity, patient should be patch free for 12 hrs daily. APPLY TO LOWER BACK, Disp: , Rfl:      losartan (COZAAR) 100 MG tablet, Take 100 mg by mouth daily, Disp: , Rfl:      melatonin 3 MG tablet, Take 3 mg by mouth At Bedtime, Disp: , Rfl:      metFORMIN (GLUCOPHAGE) 1000 MG tablet, Take 1,000 mg by mouth 2 times daily (with meals), Disp: , Rfl:      mirtazapine (REMERON) 15 MG tablet, Take 15 mg by mouth At Bedtime, Disp: , Rfl:      omeprazole (PRILOSEC) 20 MG DR capsule, Take 20 mg by mouth 2 times daily, Disp: , Rfl:      ondansetron (ZOFRAN) 4 MG tablet, Take 4 mg by mouth every 6 hours as needed for nausea, Disp: , Rfl:      polyethylene glycol (MIRALAX) 17 g packet, Take 17 g by mouth daily as needed for constipation, Disp: , Rfl:      sennosides (SENOKOT) 8.6 MG tablet, Take 2 tablets by mouth 2 times daily as needed for constipation, Disp: , Rfl:      thiamine (B-1) 100 MG tablet, Take 100 mg by mouth daily, Disp: , Rfl:      triamcinolone (KENALOG) 0.1 % external  cream, Apply topically 2 times daily APPLY TO RIGHT KNEE, Disp: , Rfl:      venlafaxine (EFFEXOR) 75 MG tablet, Take 75 mg by mouth daily, Disp: , Rfl:        Post Medication Reconciliation Status:          Case Management:  I have reviewed the facility/SNF care plan/MDS, including the falls risk, nutrition and pain screening. I also reviewed the current immunizations, and preventive care.. Future cancer screening is not clinically indicated secondary to age/goals of care Patient's desire to return to the community is not assessible due to cognitive impairment. Current Level of Care is appropriate.    Advance Directive Discussion:    I reviewed the current advanced directives as reflected in EPIC, the POLST and the facility chart, and verified the congruency of orders  CODE STATUS is full code  Team Discussion:  I communicated with the appropriate disciplines involved with the Plan of Care:   Nursing    Information reviewed:  Medications, vital signs, orders, and nursing notes.    ROS:  General: No change in weight, sleep or appetite.  Normal energy.  No fever or chills, no excessive fatigue or daytime drowsiness  Eyes: Negative for vision changes or eye problems  ENT: No problems with ears, nose or throat.  No difficulty swallowing.  Resp: No coughing, wheezing or shortness of breath, denies apnea  CV: No chest pains or palpitations  GI: No nausea, vomiting,  heartburn, abdominal pain, diarrhea, constipation or change in bowel habits  : No urinary frequency or dysuria, bladder or kidney problems  Musculoskeletal: No significant muscle or joint pains  Neurologic: No headaches, numbness, tingling, weakness, problems with balance or coordination  Skin: as above    Vitals:  /78   Pulse 88   Temp 97.4  F (36.3  C)   Resp 18   Wt 86.9 kg (191 lb 9.6 oz)   SpO2 95%   BMI 28.29 kg/m   Body mass index is 28.29 kg/m .  Exam:  Physical Exam:  Constitutional:       Appearance: Patient is well-developed.   HENT:       Head: Normocephalic.   Eyes:      Conjunctiva/sclera: Conjunctivae normal.   Neck:      Musculoskeletal: Normal range of motion.   Cardiovascular:      Rate and Rhythm: Normal rate and regular rhythm.      Heart sounds: Normal heart sounds. No murmur.   Pulmonary:      Effort: No respiratory distress.      Breath sounds: Normal breath sounds. No wheezing or rales.   Abdominal:      General: Bowel sounds are normal. There is no distension.      Palpations: Abdomen is soft.      Tenderness: There is no abdominal tenderness.   Musculoskeletal:       Normal range of motion.     Skin:General:        Skin is warm.   Neurological:         Mental Status: Patient is alert and oriented to person,  Psychiatric:         Behavior: Behavior normal    Lab/Diagnostic data:   No results found for this or any previous visit (from the past 240 hour(s)).    ASSESSMENT/PLAN  Pain management on scheduled Tylenol, capsaicin gabapentin, Voltaren gel, lidocaine patch    Type 2 diabetes A1c on 6/10/2022 was 7.3, continue metformin no current fingersticks    PTSD, on Effexor     Hypertension continue Norvasc, losartan    HDL on atorvastatin and aspirin,    Electronically signed by:  Rachel Donis CNP

## 2022-09-21 ENCOUNTER — NURSING HOME VISIT (OUTPATIENT)
Dept: GERIATRICS | Facility: CLINIC | Age: 77
End: 2022-09-21
Payer: COMMERCIAL

## 2022-09-21 DIAGNOSIS — E11.69 TYPE 2 DIABETES MELLITUS WITH OTHER SPECIFIED COMPLICATION, WITHOUT LONG-TERM CURRENT USE OF INSULIN (H): Primary | ICD-10-CM

## 2022-09-21 DIAGNOSIS — I10 PRIMARY HYPERTENSION: ICD-10-CM

## 2022-09-21 DIAGNOSIS — F33.9 RECURRENT MAJOR DEPRESSIVE DISORDER, REMISSION STATUS UNSPECIFIED (H): ICD-10-CM

## 2022-09-21 PROCEDURE — 99318 PR ANNUAL NURSING FAC ASSESSMNT, STABLE: CPT | Performed by: NURSE PRACTITIONER

## 2022-09-21 NOTE — LETTER
9/21/2022        RE: Román Houser  [facility]  [address]  [facility] [place] [facility] [address]        [facility] GERIATRICS  Chief Complaint   Patient presents with     Annual Comprehensive Nursing Home     [facility] Medical Record Number:  [id number]  Place of Service where encounter took place:  No question data found.    HPI:    Román Houser  is a 77 year old  (1945), who is being seen today for an annual comprehensive visit.  He has with past medical history AAA, depression, alcohol abuse, PTSD, sleep apnea    Today is seen to review vital signs, labs, and a routine annual visit.  He denied chest pain shortness of breath cough congestion constipation or diarrhea.  Labs reviewed last CBC and BMP within normal limits last hemoglobin 14.6.  However his triglycerides are elevated at 273 which is slightly down from 313 prior he is on 50 mg of atorvastatin.  Last A1c 7.3 on 6/10/2022.  His weights were reviewed he has been stable over the last month.  He had no concerns. And denied pain    ALLERGIES: Patient has no known allergies.  PAST MEDICAL HISTORY:   Past Medical History:   Diagnosis Date     AAA (abdominal aortic aneurysm) (H)      Bilateral carpal tunnel syndrome      Cataract      Depression      Epiretinal membrane      ETOH abuse      Hip pain      Psoriasis      PTSD (post-traumatic stress disorder)      Sleep apnea       PAST SURGICAL HISTORY:  has a past surgical history that includes CE IOL LEFT EYE.  IMMUNIZATIONS:  Immunization History   Administered Date(s) Administered     COVID-19,PF,Pfizer (12+ Yrs) 02/24/2021, 03/15/2021, 10/01/2021     Influenza Vaccine IM > 6 months Valent IIV4 (Alfuria,Fluzone) 10/08/2020, 10/01/2021     Pneumo Conj 13-V (2010&after) 01/28/2016     Pneumococcal, Unspecified 11/08/2000, 10/14/2010     Tdap (Adacel,Boostrix) 01/21/2014, 05/04/2019     Zoster vaccine recombinant adjuvanted (SHINGRIX) 01/09/2019, 06/05/2019, 02/26/2020     Zoster vaccine,  live 07/24/2012     Above immunizations pulled from [facility]. [facility] and facility records also reconciled.Future immunizations are not needed at this point as all recommended immunizations are up to date.       Current Outpatient Medications:      acetaminophen (TYLENOL) 500 MG tablet, Take 1,000 mg by mouth 2 times daily, Disp: , Rfl:      amLODIPine (NORVASC) 2.5 MG tablet, Take 2.5 mg by mouth daily, Disp: , Rfl:      aspirin 81 MG EC tablet, Take 81 mg by mouth daily, Disp: , Rfl:      atorvastatin (LIPITOR) 20 MG tablet, Take 2.5 tablets (50 mg) by mouth At Bedtime, Disp: 78 tablet, Rfl: 2     capsaicin (ZOSTRIX) 0.025 % external cream, Apply topically 3 times daily APPLY TO LOWER BACK, Disp: , Rfl:      diclofenac (VOLTAREN) 1 % topical gel, Apply 2 g topically 4 times daily as needed for moderate pain APPLY TO BACK/LEGS, Disp: , Rfl:      gabapentin (NEURONTIN) 300 MG capsule, Take 300 mg by mouth 2 times daily, Disp: , Rfl:      lidocaine (LIDODERM) 5 % patch, Place 2 patches onto the skin every 24 hours To prevent lidocaine toxicity, patient should be patch free for 12 hrs daily. APPLY TO LOWER BACK, Disp: , Rfl:      losartan (COZAAR) 100 MG tablet, Take 100 mg by mouth daily, Disp: , Rfl:      melatonin 3 MG tablet, Take 3 mg by mouth At Bedtime, Disp: , Rfl:      metFORMIN (GLUCOPHAGE) 1000 MG tablet, Take 1,000 mg by mouth 2 times daily (with meals), Disp: , Rfl:      mirtazapine (REMERON) 15 MG tablet, Take 15 mg by mouth At Bedtime, Disp: , Rfl:      omeprazole (PRILOSEC) 20 MG DR capsule, Take 20 mg by mouth 2 times daily, Disp: , Rfl:      ondansetron (ZOFRAN) 4 MG tablet, Take 4 mg by mouth every 6 hours as needed for nausea, Disp: , Rfl:      polyethylene glycol (MIRALAX) 17 g packet, Take 17 g by mouth daily as needed for constipation, Disp: , Rfl:      sennosides (SENOKOT) 8.6 MG tablet, Take 2 tablets by mouth 2 times daily as needed for constipation, Disp: , Rfl:      thiamine (B-1) 100 MG  tablet, Take 100 mg by mouth daily, Disp: , Rfl:      triamcinolone (KENALOG) 0.1 % external cream, Apply topically 2 times daily APPLY TO RIGHT KNEE, Disp: , Rfl:      venlafaxine (EFFEXOR) 75 MG tablet, Take 75 mg by mouth daily, Disp: , Rfl:        Post Medication Reconciliation Status:          Case Management:  I have reviewed the facility/SNF care plan/MDS, including the falls risk, nutrition and pain screening. I also reviewed the current immunizations, and preventive care.. Future cancer screening is not clinically indicated secondary to age/goals of care Patient's desire to return to the community is not assessible due to cognitive impairment. Current Level of Care is appropriate.    Advance Directive Discussion:    I reviewed the current advanced directives as reflected in EPIC, the POLST and the facility chart, and verified the congruency of orders  CODE STATUS is full code  Team Discussion:  I communicated with the appropriate disciplines involved with the Plan of Care:   Nursing    Information reviewed:  Medications, vital signs, orders, and nursing notes.    ROS:  General: No change in weight, sleep or appetite.  Normal energy.  No fever or chills, no excessive fatigue or daytime drowsiness  Eyes: Negative for vision changes or eye problems  ENT: No problems with ears, nose or throat.  No difficulty swallowing.  Resp: No coughing, wheezing or shortness of breath, denies apnea  CV: No chest pains or palpitations  GI: No nausea, vomiting,  heartburn, abdominal pain, diarrhea, constipation or change in bowel habits  : No urinary frequency or dysuria, bladder or kidney problems  Musculoskeletal: No significant muscle or joint pains  Neurologic: No headaches, numbness, tingling, weakness, problems with balance or coordination  Skin: as above    Vitals:  /78   Pulse 88   Temp 97.4  F (36.3  C)   Resp 18   Wt 86.9 kg (191 lb 9.6 oz)   SpO2 95%   BMI 28.29 kg/m   Body mass index is 28.29  kg/m .  Exam:  Physical Exam:  Constitutional:       Appearance: Patient is well-developed.   HENT:      Head: Normocephalic.   Eyes:      Conjunctiva/sclera: Conjunctivae normal.   Neck:      Musculoskeletal: Normal range of motion.   Cardiovascular:      Rate and Rhythm: Normal rate and regular rhythm.      Heart sounds: Normal heart sounds. No murmur.   Pulmonary:      Effort: No respiratory distress.      Breath sounds: Normal breath sounds. No wheezing or rales.   Abdominal:      General: Bowel sounds are normal. There is no distension.      Palpations: Abdomen is soft.      Tenderness: There is no abdominal tenderness.   Musculoskeletal:       Normal range of motion.     Skin:General:        Skin is warm.   Neurological:         Mental Status: Patient is alert and oriented to person,  Psychiatric:         Behavior: Behavior normal    Lab/Diagnostic data:   No results found for this or any previous visit (from the past 240 hour(s)).    ASSESSMENT/PLAN  Pain management on scheduled Tylenol, capsaicin gabapentin, Voltaren gel, lidocaine patch    Type 2 diabetes A1c on 6/10/2022 was 7.3, continue metformin no current fingersticks    PTSD, on Effexor     Hypertension continue Norvasc, losartan    HDL on atorvastatin and aspirin,    Electronically signed by:  Rachel Donis CNP           Sincerely,        Rachel Donis CNP

## 2022-10-18 ENCOUNTER — LAB REQUISITION (OUTPATIENT)
Dept: LAB | Facility: CLINIC | Age: 77
End: 2022-10-18

## 2022-10-18 DIAGNOSIS — E78.5 HYPERLIPIDEMIA, UNSPECIFIED: ICD-10-CM

## 2022-10-21 LAB
CHOLEST SERPL-MCNC: 145 MG/DL
HDLC SERPL-MCNC: 29 MG/DL
LDLC SERPL CALC-MCNC: 46 MG/DL
NONHDLC SERPL-MCNC: 116 MG/DL
TRIGL SERPL-MCNC: 348 MG/DL

## 2022-10-21 PROCEDURE — P9604 ONE-WAY ALLOW PRORATED TRIP: HCPCS | Performed by: NURSE PRACTITIONER

## 2022-10-21 PROCEDURE — 36415 COLL VENOUS BLD VENIPUNCTURE: CPT | Performed by: NURSE PRACTITIONER

## 2022-10-21 PROCEDURE — 80061 LIPID PANEL: CPT | Performed by: NURSE PRACTITIONER

## 2022-11-10 ENCOUNTER — NURSING HOME VISIT (OUTPATIENT)
Dept: GERIATRICS | Facility: CLINIC | Age: 77
End: 2022-11-10
Payer: COMMERCIAL

## 2022-11-10 VITALS
DIASTOLIC BLOOD PRESSURE: 97 MMHG | BODY MASS INDEX: 29.12 KG/M2 | HEART RATE: 99 BPM | WEIGHT: 196.6 LBS | SYSTOLIC BLOOD PRESSURE: 144 MMHG | RESPIRATION RATE: 18 BRPM | TEMPERATURE: 97.8 F | OXYGEN SATURATION: 96 % | HEIGHT: 69 IN

## 2022-11-10 DIAGNOSIS — E11.69 TYPE 2 DIABETES MELLITUS WITH OTHER SPECIFIED COMPLICATION, WITHOUT LONG-TERM CURRENT USE OF INSULIN (H): ICD-10-CM

## 2022-11-10 DIAGNOSIS — F33.9 RECURRENT MAJOR DEPRESSIVE DISORDER, REMISSION STATUS UNSPECIFIED (H): ICD-10-CM

## 2022-11-10 DIAGNOSIS — F04 WERNICKE-KORSAKOFF SYNDROME (H): ICD-10-CM

## 2022-11-10 DIAGNOSIS — I10 PRIMARY HYPERTENSION: Primary | ICD-10-CM

## 2022-11-10 PROCEDURE — 99309 SBSQ NF CARE MODERATE MDM 30: CPT | Performed by: INTERNAL MEDICINE

## 2022-11-10 NOTE — LETTER
"    11/10/2022        RE: Román Houser  [facility]  [address]  [facility] [address]        [facility] GERIATRICS  REGULATORY VISIT  November 10, 2022    [facility] Medical Record Number:  [id number]  Place of Service where encounter took place:  [facility] () []    Chief Complaint   Patient presents with     half-way Regulatory       HPI:    Román Houser is a 77 year old  (1945), who is being seen today for a federally mandated E/M visit at [facility] where he has resided since May 2022.  HPI information obtained from: facility chart records, facility staff, patient report, [facility] chart review and Care Everywhere [facility] chart review.    Today, Mr. Houser is seen sitting in a chair after breakfast. He is a limited historian due to dementia. He tells me he is doing \"just fine\" today. No aches or pains. [facility] or [facility] any upcoming [facility]. No concerns per discussion with nursing.       ALLERGIES:  No Known Allergies     Past Medical, Surgical, Family and Social History: Reviewed and updated in EPIC.    MEDICATIONS:  Current Outpatient Medications   Medication Sig Dispense Refill     acetaminophen (TYLENOL) 500 MG tablet Take 1,000 mg by mouth 2 times daily       amLODIPine (NORVASC) 2.5 MG tablet Take 2.5 mg by mouth daily       aspirin 81 MG EC tablet Take 81 mg by mouth daily       atorvastatin (LIPITOR) 20 MG tablet Take 2.5 tablets (50 mg) by mouth At Bedtime 78 tablet 2     capsaicin (ZOSTRIX) 0.025 % external cream Apply topically 3 times daily APPLY TO LOWER BACK       diclofenac (VOLTAREN) 1 % topical gel Apply 2 g topically 4 times daily as needed for moderate pain APPLY TO BACK/LEGS       gabapentin (NEURONTIN) 300 MG capsule Take 300 mg by mouth 2 times daily       lidocaine (LIDODERM) 5 % patch Place 2 patches onto the skin every 24 hours To prevent lidocaine toxicity, patient should be " "patch free for 12 hrs daily. APPLY TO LOWER BACK       losartan (COZAAR) 100 MG tablet Take 100 mg by mouth daily       melatonin 3 MG tablet Take 3 mg by mouth At Bedtime       metFORMIN (GLUCOPHAGE) 1000 MG tablet Take 1,000 mg by mouth 2 times daily (with meals)       mirtazapine (REMERON) 15 MG tablet Take 15 mg by mouth At Bedtime       omeprazole (PRILOSEC) 20 MG DR capsule Take 20 mg by mouth 2 times daily       ondansetron (ZOFRAN) 4 MG tablet Take 4 mg by mouth every 6 hours as needed for nausea       polyethylene glycol (MIRALAX) 17 g packet Take 17 g by mouth daily as needed for constipation       sennosides (SENOKOT) 8.6 MG tablet Take 2 tablets by mouth 2 times daily as needed for constipation       thiamine (B-1) 100 MG tablet Take 100 mg by mouth daily       triamcinolone (KENALOG) 0.1 % external cream Apply topically 2 times daily APPLY TO RIGHT KNEE       venlafaxine (EFFEXOR) 75 MG tablet Take 75 mg by mouth daily       Medications reviewed:  Medications reconciled to facility chart and changes were made to reflect current medications as identified as above med list. Below are the changes that were made:   Medications stopped since last EPIC medication reconciliation:   There are no discontinued medications.  Medications started since last [facility] medication reconciliation:  No orders of the defined types were placed in this encounter.      REVIEW OF SYSTEMS:  Unable to be obtained due to cognitive impairment or aphasia.     PHYSICAL EXAM:  BP (!) 144/97   Pulse 99   Temp 97.8  F (36.6  C)   Resp 18   Ht 1.753 m (5' 9\")   Wt 89.2 kg (196 lb 9.6 oz)   SpO2 96%   BMI 29.03 kg/m    Gen: sitting in chair, alert, cooperative and in no acute distress  Resp: breathing non labored, no tachypnea   Ext: no LE edema  Neuro: CX II-XII grossly in tact; ROM in all four extremities grossly in tact  Psych: memory, judgement and insight impaired      LABS/IMAGING: Reviewed as per Epic and/or " [facility]    ASSESSMENT / PLAN:    HTN  SBPs 130s-150s.   -- amlodipine 2.5 mg daily, losartan 100 mg daily   -- follow BPs and adjust medications as needed    Werolenae-Korcornelius  Major Recurrent Depression  Clinical picture of anterograde amnesia plus imaging findings support the diagnosis. BIMS 8/15.   -- thiamine 100 mg daily   -- mirtazapine 15 mg at bedtime and venlafaxine 75 mg daily  -- supportive cares     DM, Type II  Hgb A1c 7.3 in June   -- metformin 1000 mg BID  -- follow annual A1c     Electronically signed by  Mona Davalos MD                Sincerely,        Mona Davalos MD

## 2022-11-10 NOTE — PROGRESS NOTES
"[facility] GERIATRICS  REGULATORY VISIT  November 10, 2022    [facility] Medical Record Number:  [id number]  Place of Service where encounter took place:  [facility] () [[id number]]    Chief Complaint   Patient presents with     correction Regulatory       HPI:    Román Houser is a 77 year old  (1945), who is being seen today for a federally mandated E/M visit at [facility] where he has resided since May 2022.  HPI information obtained from: facility chart records, facility staff, patient report, [facility] chart review and Care Everywhere [facility] chart review.    Today, Mr. Houser is seen sitting in a chair after breakfast. He is a limited historian due to dementia. He tells me he is doing \"just fine\" today. No aches or pains. Isn't sure [facility] or has any upcoming appointments. No concerns per discussion with nursing.       ALLERGIES:  No Known Allergies     Past Medical, Surgical, Family and Social History: Reviewed and updated in EPIC.    MEDICATIONS:  Current Outpatient Medications   Medication Sig Dispense Refill     acetaminophen (TYLENOL) 500 MG tablet Take 1,000 mg by mouth 2 times daily       amLODIPine (NORVASC) 2.5 MG tablet Take 2.5 mg by mouth daily       aspirin 81 MG EC tablet Take 81 mg by mouth daily       atorvastatin (LIPITOR) 20 MG tablet Take 2.5 tablets (50 mg) by mouth At Bedtime 78 tablet 2     capsaicin (ZOSTRIX) 0.025 % external cream Apply topically 3 times daily APPLY TO LOWER BACK       diclofenac (VOLTAREN) 1 % topical gel Apply 2 g topically 4 times daily as needed for moderate pain APPLY TO BACK/LEGS       gabapentin (NEURONTIN) 300 MG capsule Take 300 mg by mouth 2 times daily       lidocaine (LIDODERM) 5 % patch Place 2 patches onto the skin every 24 hours To prevent lidocaine toxicity, patient should be patch free for 12 hrs daily. APPLY TO LOWER BACK       losartan (COZAAR) 100 MG tablet Take 100 mg by mouth daily   " "    melatonin 3 MG tablet Take 3 mg by mouth At Bedtime       metFORMIN (GLUCOPHAGE) 1000 MG tablet Take 1,000 mg by mouth 2 times daily (with meals)       mirtazapine (REMERON) 15 MG tablet Take 15 mg by mouth At Bedtime       omeprazole (PRILOSEC) 20 MG DR capsule Take 20 mg by mouth 2 times daily       ondansetron (ZOFRAN) 4 MG tablet Take 4 mg by mouth every 6 hours as needed for nausea       polyethylene glycol (MIRALAX) 17 g packet Take 17 g by mouth daily as needed for constipation       sennosides (SENOKOT) 8.6 MG tablet Take 2 tablets by mouth 2 times daily as needed for constipation       thiamine (B-1) 100 MG tablet Take 100 mg by mouth daily       triamcinolone (KENALOG) 0.1 % external cream Apply topically 2 times daily APPLY TO RIGHT KNEE       venlafaxine (EFFEXOR) 75 MG tablet Take 75 mg by mouth daily       Medications reviewed:  Medications reconciled to facility chart and changes were made to reflect current medications as identified as above med list. Below are the changes that were made:   Medications stopped since last EPIC medication reconciliation:   There are no discontinued medications.  Medications started since last [facility] medication reconciliation:  No orders of the defined types were placed in this encounter.      REVIEW OF SYSTEMS:  Unable to be obtained due to cognitive impairment or aphasia.     PHYSICAL EXAM:  BP (!) 144/97   Pulse 99   Temp 97.8  F (36.6  C)   Resp 18   Ht 1.753 m (5' 9\")   Wt 89.2 kg (196 lb 9.6 oz)   SpO2 96%   BMI 29.03 kg/m    Gen: sitting in chair, alert, cooperative and in no acute distress  Resp: breathing non labored, no tachypnea   Ext: no LE edema  Neuro: CX II-XII grossly in tact; ROM in all four extremities grossly in tact  Psych: memory, judgement and insight impaired      LABS/IMAGING: Reviewed as per Epic and/or [facility]    ASSESSMENT / PLAN:    HTN  SBPs 130s-150s.   -- amlodipine 2.5 mg daily, losartan 100 mg daily   -- follow BPs and " adjust medications as needed    Werolenae-Korsakogoldie  Major Recurrent Depression  Clinical picture of anterograde amnesia plus imaging findings support the diagnosis. BIMS 8/15.   -- thiamine 100 mg daily   -- mirtazapine 15 mg at bedtime and venlafaxine 75 mg daily  -- supportive cares     DM, Type II  Hgb A1c 7.3 in June   -- metformin 1000 mg BID  -- follow annual A1c     Electronically signed by  Mona Davalos MD

## 2023-01-20 ENCOUNTER — NURSING HOME VISIT (OUTPATIENT)
Dept: GERIATRICS | Facility: CLINIC | Age: 78
End: 2023-01-20
Payer: COMMERCIAL

## 2023-01-20 VITALS
DIASTOLIC BLOOD PRESSURE: 83 MMHG | RESPIRATION RATE: 16 BRPM | TEMPERATURE: 97.4 F | OXYGEN SATURATION: 95 % | WEIGHT: 194.8 LBS | BODY MASS INDEX: 28.85 KG/M2 | HEART RATE: 80 BPM | HEIGHT: 69 IN | SYSTOLIC BLOOD PRESSURE: 129 MMHG

## 2023-01-20 DIAGNOSIS — E11.69 TYPE 2 DIABETES MELLITUS WITH OTHER SPECIFIED COMPLICATION, WITHOUT LONG-TERM CURRENT USE OF INSULIN (H): ICD-10-CM

## 2023-01-20 DIAGNOSIS — I10 PRIMARY HYPERTENSION: Primary | ICD-10-CM

## 2023-01-20 DIAGNOSIS — E78.1 HIGH BLOOD TRIGLYCERIDES: ICD-10-CM

## 2023-01-20 PROCEDURE — 99309 SBSQ NF CARE MODERATE MDM 30: CPT | Performed by: NURSE PRACTITIONER

## 2023-01-20 NOTE — PROGRESS NOTES
[facility] GERIATRICS  Chief Complaint   Patient presents with     halfway Regulatory     [facility] Medical Record Number:  [id number]  Place of Service where encounter took place:  [facility] () [[id number]]    HPI:    Román Houser  is 77 year old (1945), who is being seen today for a federally mandated E/M visit. Today's concerns are:     Primary hypertension  High blood triglycerides  Type 2 diabetes mellitus with other specified complication, without long-term current use of insulin (H)     Patient seen today for a regulatory visit in LT. Patient notes he is doing well. He offers not acute concerns or complaints at today's visit. Staff with no concerns noted. Appetite is doing well. He is sleeping well. Denies CP, palpitations, fatigue, nausea, vomiting, increased SOB/HUANG, fever, chills, and/or b/b concerns today.    ALLERGIES:Patient has no known allergies.  PAST MEDICAL HISTORY:   Past Medical History:   Diagnosis Date     AAA (abdominal aortic aneurysm)      Bilateral carpal tunnel syndrome      Cataract      Depression      Epiretinal membrane      ETOH abuse      Hip pain      Psoriasis      PTSD (post-traumatic stress disorder)      Sleep apnea      PAST SURGICAL HISTORY:   has a past surgical history that includes CE IOL LEFT EYE.  FAMILY HISTORY: family history is not on file.  SOCIAL HISTORY:  reports that he has quit smoking. He does not have any smokeless tobacco history on file.    MEDICATIONS:  MED REC REQUIRED  Post Medication Reconciliation Status: patient was not discharged from an inpatient facility or TCU         Review of your medicines          Accurate as of January 20, 2023 11:59 PM. If you have any questions, ask your nurse or doctor.            CONTINUE these medicines which have NOT CHANGED      Dose / Directions   acetaminophen 500 MG tablet  Commonly known as: TYLENOL      Dose: 1,000 mg  Take 1,000 mg by mouth 2 times daily  Refills: 0     amLODIPine 2.5 MG  tablet  Commonly known as: NORVASC      Dose: 2.5 mg  Take 2.5 mg by mouth daily  Refills: 0     aspirin 81 MG EC tablet      Dose: 81 mg  Take 81 mg by mouth daily  Refills: 0     atorvastatin 20 MG tablet  Commonly known as: LIPITOR  Used for: Mixed hyperlipidemia      Dose: 50 mg  Take 2.5 tablets (50 mg) by mouth At Bedtime  Quantity: 78 tablet  Refills: 2     capsaicin 0.025 % external cream  Commonly known as: ZOSTRIX      Apply topically 3 times daily APPLY TO LOWER BACK  Refills: 0     diclofenac 1 % topical gel  Commonly known as: VOLTAREN      Dose: 2 g  Apply 2 g topically 4 times daily as needed for moderate pain APPLY TO BACK/LEGS  Refills: 0     gabapentin 300 MG capsule  Commonly known as: NEURONTIN      Dose: 300 mg  Take 300 mg by mouth 2 times daily  Refills: 0     lidocaine 5 % patch  Commonly known as: LIDODERM      Dose: 2 patch  Place 2 patches onto the skin every 24 hours To prevent lidocaine toxicity, patient should be patch free for 12 hrs daily. APPLY TO LOWER BACK  Refills: 0     losartan 100 MG tablet  Commonly known as: COZAAR      Dose: 100 mg  Take 100 mg by mouth daily  Refills: 0     melatonin 3 MG tablet      Dose: 3 mg  Take 3 mg by mouth At Bedtime  Refills: 0     metFORMIN 1000 MG tablet  Commonly known as: GLUCOPHAGE      Dose: 1,000 mg  Take 1,000 mg by mouth 2 times daily (with meals)  Refills: 0     mirtazapine 15 MG tablet  Commonly known as: REMERON      Dose: 15 mg  Take 15 mg by mouth At Bedtime  Refills: 0     omeprazole 20 MG DR capsule  Commonly known as: priLOSEC      Dose: 20 mg  Take 20 mg by mouth 2 times daily  Refills: 0     ondansetron 4 MG tablet  Commonly known as: ZOFRAN      Dose: 4 mg  Take 4 mg by mouth every 6 hours as needed for nausea  Refills: 0     polyethylene glycol 17 g packet  Commonly known as: MIRALAX      Dose: 17 g  Take 17 g by mouth daily as needed for constipation  Refills: 0     sennosides 8.6 MG tablet  Commonly known as: SENOKOT       "Dose: 2 tablet  Take 2 tablets by mouth 2 times daily as needed for constipation  Refills: 0     thiamine 100 MG tablet  Commonly known as: B-1      Dose: 100 mg  Take 100 mg by mouth daily  Refills: 0     triamcinolone 0.1 % external cream  Commonly known as: KENALOG      Apply topically 2 times daily APPLY TO RIGHT KNEE  Refills: 0     venlafaxine 75 MG tablet  Commonly known as: EFFEXOR      Dose: 75 mg  Take 75 mg by mouth daily  Refills: 0             Case Management:  I have reviewed the care plan and MDS and do agree with the plan. Patient's desire to return to the community is present, but is not able due to care needs . Information reviewed:  Medications, vital signs, orders, and nursing notes.    ROS:  10 point ROS of systems including Constitutional, Eyes, Respiratory, Cardiovascular, Gastroenterology, Genitourinary, Integumentary, Musculoskeletal, Psychiatric were all negative except for pertinent positives noted in my HPI.    Vitals:  /83   Pulse 80   Temp 97.4  F (36.3  C)   Resp 16   Ht 1.753 m (5' 9\")   Wt 88.4 kg (194 lb 12.8 oz)   SpO2 95%   BMI 28.77 kg/m    Body mass index is 28.77 kg/m .  Exam:  GENERAL APPEARANCE:  Alert, in no distress, appears healthy, cooperative, elderly male sitting in chair in day room  EYES:  EOM, conjunctivae, lids, pupils and irises normal, wears glasses  RESP: Respiratory effort and palpation of chest normal, lungs clear to auscultation, no respiratory distress  CV:  Palpation and auscultation of heart done, regular rate and rhythm, no murmur, rub, or gallop  ABDOMEN:  normal bowel sounds, soft, nontender, no hepatosplenomegaly or other masses  M/S:   Gait and station - RADHA 2/2 patient sitting in chair; Digits and nails abnormal - arthritic changes present  SKIN:  Inspection of skin and subcutaneous tissue baseline, Palpation of skin and subcutaneous tissue baseline, skin thin, fragile and dry  NEURO:   Cranial nerves 2-12 are normal tested and grossly at " patient's baseline  PSYCH:  oriented to 1-2, insight and judgement impaired, memory impaired, affect and mood normal    Lab/Diagnostic data:   Recent labs in [facility] reviewed by me today.   CBC RESULTS: Recent Labs   Lab Test 06/10/22  0824   WBC 7.0   RBC 4.57   HGB 14.6   HCT 45.4   MCV 99   MCH 31.9   MCHC 32.2   RDW 13.2          Last Basic Metabolic Panel:  Recent Labs   Lab Test 06/10/22  0824      POTASSIUM 4.7   CHLORIDE 102   MARIA ISABEL 10.0   CO2 27   BUN 23   CR 1.14   *       Liver Function Studies -   Recent Labs   Lab Test 06/10/22  0824   PROTTOTAL 7.8   ALBUMIN 4.0   BILITOTAL 0.6   ALKPHOS 103   AST 20   ALT 31     Lab Results   Component Value Date    A1C 7.3 06/10/2022       ASSESSMENT/PLAN    ICD-10-CM    1. Primary hypertension  I10 Chronic - variable. Recent BPs shown below; goal given age and comorbid conditions <140/90 which he is at this time. Will continue current regimen of Norvasc and Losartan as ordered with ongoing monitoring of VS per facility protocol.        2. High blood triglycerides  E78.1 Chronic - unstable. Last FLP as shown below. Will continue Atorvastatin as ordered with recheck of FLP at this time.      3. Type 2 diabetes mellitus with other specified complication, without long-term current use of insulin (H)  E11.69 Chronic - stable. No current BG monitoring - Last A1c as noted above. Will continue current regimen of Metformin and recheck A1c at this time.        Electronically signed by:  Dr. Racquel Cunningham, APRN, DNP, AGNP-BC, PMHNP-BC  [employer] [facility]  Office Hours: Tues-Fri 7713-8981  Office: [address] [place] [address]  Fax - [phone]  Triage Phone - [phone]  Business Office- [phone]      Email: [email or web address]

## 2023-01-20 NOTE — LETTER
1/20/2023        RE: Román Houser  [facility]  [address]  [facility] [address]        [facility] GERIATRICS  Chief Complaint   Patient presents with     [facility] Regulatory     [facility] Medical Record Number:  [id number]  Place of Service where encounter took place:  [facility] () [[id number]]    HPI:    Román Houser  is 77 year old (1945), who is being seen today for a federally mandated E/M visit. Today's concerns are:     Primary hypertension  High blood triglycerides  Type 2 diabetes mellitus with other specified complication, without long-term current use of insulin (H)     Patient seen today for a regulatory visit in [facility]. Patient notes he is doing well. He offers not acute concerns or complaints at today's visit. Staff with no concerns noted. Appetite is doing well. He is sleeping well. Denies CP, palpitations, fatigue, nausea, vomiting, increased SOB/HUANG, fever, chills, and/or b/b concerns today.    ALLERGIES:Patient has no known allergies.  PAST MEDICAL HISTORY:   Past Medical History:   Diagnosis Date     AAA (abdominal aortic aneurysm)      Bilateral carpal tunnel syndrome      Cataract      Depression      Epiretinal membrane      ETOH abuse      Hip pain      Psoriasis      PTSD (post-traumatic stress disorder)      Sleep apnea      PAST SURGICAL HISTORY:   has a past surgical history that includes CE IOL LEFT EYE.  FAMILY HISTORY: family history is not on file.  SOCIAL HISTORY:  reports that he has quit smoking. He does not have any smokeless tobacco history on file.    MEDICATIONS:  MED REC REQUIRED  Post Medication Reconciliation Status: patient was not discharged from an inpatient facility or TCU         Review of your medicines          Accurate as of January 20, 2023 11:59 PM. If you have any questions, ask your nurse or doctor.            CONTINUE these medicines which have NOT CHANGED      Dose / Directions   acetaminophen 500 MG  tablet  Commonly known as: TYLENOL      Dose: 1,000 mg  Take 1,000 mg by mouth 2 times daily  Refills: 0     amLODIPine 2.5 MG tablet  Commonly known as: NORVASC      Dose: 2.5 mg  Take 2.5 mg by mouth daily  Refills: 0     aspirin 81 MG EC tablet      Dose: 81 mg  Take 81 mg by mouth daily  Refills: 0     atorvastatin 20 MG tablet  Commonly known as: LIPITOR  Used for: Mixed hyperlipidemia      Dose: 50 mg  Take 2.5 tablets (50 mg) by mouth At Bedtime  Quantity: 78 tablet  Refills: 2     capsaicin 0.025 % external cream  Commonly known as: ZOSTRIX      Apply topically 3 times daily APPLY TO LOWER BACK  Refills: 0     diclofenac 1 % topical gel  Commonly known as: VOLTAREN      Dose: 2 g  Apply 2 g topically 4 times daily as needed for moderate pain APPLY TO BACK/LEGS  Refills: 0     gabapentin 300 MG capsule  Commonly known as: NEURONTIN      Dose: 300 mg  Take 300 mg by mouth 2 times daily  Refills: 0     lidocaine 5 % patch  Commonly known as: LIDODERM      Dose: 2 patch  Place 2 patches onto the skin every 24 hours To prevent lidocaine toxicity, patient should be patch free for 12 hrs daily. APPLY TO LOWER BACK  Refills: 0     losartan 100 MG tablet  Commonly known as: COZAAR      Dose: 100 mg  Take 100 mg by mouth daily  Refills: 0     melatonin 3 MG tablet      Dose: 3 mg  Take 3 mg by mouth At Bedtime  Refills: 0     metFORMIN 1000 MG tablet  Commonly known as: GLUCOPHAGE      Dose: 1,000 mg  Take 1,000 mg by mouth 2 times daily (with meals)  Refills: 0     mirtazapine 15 MG tablet  Commonly known as: REMERON      Dose: 15 mg  Take 15 mg by mouth At Bedtime  Refills: 0     omeprazole 20 MG DR capsule  Commonly known as: priLOSEC      Dose: 20 mg  Take 20 mg by mouth 2 times daily  Refills: 0     ondansetron 4 MG tablet  Commonly known as: ZOFRAN      Dose: 4 mg  Take 4 mg by mouth every 6 hours as needed for nausea  Refills: 0     polyethylene glycol 17 g packet  Commonly known as: MIRALAX      Dose: 17  "g  Take 17 g by mouth daily as needed for constipation  Refills: 0     sennosides 8.6 MG tablet  Commonly known as: SENOKOT      Dose: 2 tablet  Take 2 tablets by mouth 2 times daily as needed for constipation  Refills: 0     thiamine 100 MG tablet  Commonly known as: B-1      Dose: 100 mg  Take 100 mg by mouth daily  Refills: 0     triamcinolone 0.1 % external cream  Commonly known as: KENALOG      Apply topically 2 times daily APPLY TO RIGHT KNEE  Refills: 0     venlafaxine 75 MG tablet  Commonly known as: EFFEXOR      Dose: 75 mg  Take 75 mg by mouth daily  Refills: 0             Case Management:  I have reviewed the care plan and MDS and do agree with the plan. Patient's desire to return to the community is present, but is not able due to care needs . Information reviewed:  Medications, vital signs, orders, and nursing notes.    ROS:  10 point ROS of systems including Constitutional, Eyes, Respiratory, Cardiovascular, Gastroenterology, Genitourinary, Integumentary, Musculoskeletal, Psychiatric were all negative except for pertinent positives noted in my HPI.    Vitals:  /83   Pulse 80   Temp 97.4  F (36.3  C)   Resp 16   Ht 1.753 m (5' 9\")   Wt 88.4 kg (194 lb 12.8 oz)   SpO2 95%   BMI 28.77 kg/m    Body mass index is 28.77 kg/m .  Exam:  GENERAL APPEARANCE:  Alert, in no distress, appears healthy, cooperative, elderly male sitting in chair in day room  EYES:  EOM, conjunctivae, lids, pupils and irises normal, wears glasses  RESP: Respiratory effort and palpation of chest normal, lungs clear to auscultation, no respiratory distress  CV:  Palpation and auscultation of heart done, regular rate and rhythm, no murmur, rub, or gallop  ABDOMEN:  normal bowel sounds, soft, nontender, no hepatosplenomegaly or other masses  M/S:   Gait and station - RADHA 2/2 patient sitting in chair; Digits and nails abnormal - arthritic changes present  SKIN:  Inspection of skin and subcutaneous tissue baseline, Palpation of " skin and subcutaneous tissue baseline, skin thin, fragile and dry  NEURO:   Cranial nerves 2-12 are normal tested and grossly at patient's baseline  PSYCH:  oriented to 1-2, insight and judgement impaired, memory impaired, affect and mood normal    Lab/Diagnostic data:   Recent labs in [facility] reviewed by me today.   CBC RESULTS: Recent Labs   Lab Test 06/10/22  0824   WBC 7.0   RBC 4.57   HGB 14.6   HCT 45.4   MCV 99   MCH 31.9   MCHC 32.2   RDW 13.2          Last Basic Metabolic Panel:  Recent Labs   Lab Test 06/10/22  0824      POTASSIUM 4.7   CHLORIDE 102   MARIA ISABEL 10.0   CO2 27   BUN 23   CR 1.14   *       Liver Function Studies -   Recent Labs   Lab Test 06/10/22  0824   PROTTOTAL 7.8   ALBUMIN 4.0   BILITOTAL 0.6   ALKPHOS 103   AST 20   ALT 31     Lab Results   Component Value Date    A1C 7.3 06/10/2022       ASSESSMENT/PLAN    ICD-10-CM    1. Primary hypertension  I10 Chronic - variable. Recent BPs shown below; goal given age and comorbid conditions <140/90 which he is at this time. Will continue current regimen of Norvasc and Losartan as ordered with ongoing monitoring of VS per facility protocol.        2. High blood triglycerides  E78.1 Chronic - unstable. Last FLP as shown below. Will continue Atorvastatin as ordered with recheck of FLP at this time.      3. Type 2 diabetes mellitus with other specified complication, without long-term current use of insulin (H)  E11.69 Chronic - stable. No current BG monitoring - Last A1c as noted above. Will continue current regimen of Metformin and recheck A1c at this time.        Electronically signed by:  Dr. Racquel Cunningham, LONG, DNP, AGNP-BC, PMHNP-[facility]  Office Hours: Tues-Fri 5015-1224  Office: [address] [place] [address]  Fax - [phone]  Triage Phone - [phone]  Business Office- [phone]      Email: [email or web address]                     Sincerely,        LONG Pennington CNP

## 2023-01-21 ENCOUNTER — LAB REQUISITION (OUTPATIENT)
Dept: LAB | Facility: CLINIC | Age: 78
End: 2023-01-21

## 2023-01-21 DIAGNOSIS — E11.9 TYPE 2 DIABETES MELLITUS WITHOUT COMPLICATIONS (H): ICD-10-CM

## 2023-01-21 DIAGNOSIS — E78.5 HYPERLIPIDEMIA, UNSPECIFIED: ICD-10-CM

## 2023-01-23 ENCOUNTER — TELEPHONE (OUTPATIENT)
Dept: GERIATRICS | Facility: CLINIC | Age: 78
End: 2023-01-23
Payer: COMMERCIAL

## 2023-01-23 DIAGNOSIS — E78.1 HIGH BLOOD TRIGLYCERIDES: ICD-10-CM

## 2023-01-23 DIAGNOSIS — E11.65 TYPE 2 DIABETES MELLITUS WITH HYPERGLYCEMIA, WITHOUT LONG-TERM CURRENT USE OF INSULIN (H): Primary | ICD-10-CM

## 2023-01-23 LAB
CHOLEST SERPL-MCNC: 114 MG/DL
HBA1C MFR BLD: 10.8 %
HDLC SERPL-MCNC: 22 MG/DL
LDLC SERPL CALC-MCNC: 25 MG/DL
NONHDLC SERPL-MCNC: 92 MG/DL
TRIGL SERPL-MCNC: 333 MG/DL

## 2023-01-23 PROCEDURE — P9604 ONE-WAY ALLOW PRORATED TRIP: HCPCS | Performed by: NURSE PRACTITIONER

## 2023-01-23 PROCEDURE — 36415 COLL VENOUS BLD VENIPUNCTURE: CPT | Performed by: NURSE PRACTITIONER

## 2023-01-23 PROCEDURE — 80061 LIPID PANEL: CPT | Performed by: NURSE PRACTITIONER

## 2023-01-23 PROCEDURE — 83036 HEMOGLOBIN GLYCOSYLATED A1C: CPT | Performed by: NURSE PRACTITIONER

## 2023-01-23 RX ORDER — GEMFIBROZIL 600 MG/1
600 TABLET, FILM COATED ORAL
Qty: 62 TABLET | Refills: 11 | Status: SHIPPED | OUTPATIENT
Start: 2023-01-23 | End: 2023-10-18

## 2023-01-23 RX ORDER — DULAGLUTIDE 0.75 MG/.5ML
0.75 INJECTION, SOLUTION SUBCUTANEOUS
Qty: 0.5 ML | Refills: 4 | Status: SHIPPED | OUTPATIENT
Start: 2023-01-23 | End: 2023-02-07

## 2023-01-23 NOTE — TELEPHONE ENCOUNTER
[facility] Triage Nurse Telephone Encounter    Provider: LONG Childers DNP  Facility: [facility] Facility Type:  [facility]    Caller: Kristin   Call Back Number: [phone]    Allergies:  No Known Allergies     Reason for call: Lipids and A1C     Verbal Order/Direction given by Provider:     -Start Trulicity 0.75mg subcutaneous qWeek  -Check blood glucose BID        *Dx. DM2  -Discontinue Atorvastatin  -Start Gemfibrozil 600mg PO BID before breakfast and before dinner       *Hypertriglyceridemia  Provider giving Order:  LONG Childers DNP    Verbal Order given to: Andreas Steward RN

## 2023-02-07 DIAGNOSIS — E11.65 TYPE 2 DIABETES MELLITUS WITH HYPERGLYCEMIA, WITHOUT LONG-TERM CURRENT USE OF INSULIN (H): ICD-10-CM

## 2023-02-07 RX ORDER — DULAGLUTIDE 0.75 MG/.5ML
0.75 INJECTION, SOLUTION SUBCUTANEOUS
Qty: 0.5 ML | Refills: 11 | Status: SHIPPED | OUTPATIENT
Start: 2023-02-07 | End: 2023-04-13

## 2023-03-01 ENCOUNTER — NURSING HOME VISIT (OUTPATIENT)
Dept: GERIATRICS | Facility: CLINIC | Age: 78
End: 2023-03-01
Payer: COMMERCIAL

## 2023-03-01 VITALS
BODY MASS INDEX: 28.38 KG/M2 | TEMPERATURE: 97.7 F | WEIGHT: 191.6 LBS | HEART RATE: 97 BPM | RESPIRATION RATE: 20 BRPM | HEIGHT: 69 IN | OXYGEN SATURATION: 98 % | DIASTOLIC BLOOD PRESSURE: 97 MMHG | SYSTOLIC BLOOD PRESSURE: 139 MMHG

## 2023-03-01 DIAGNOSIS — N18.30 STAGE 3 CHRONIC KIDNEY DISEASE, UNSPECIFIED WHETHER STAGE 3A OR 3B CKD (H): ICD-10-CM

## 2023-03-01 DIAGNOSIS — I12.9 BENIGN HYPERTENSIVE KIDNEY DISEASE WITH CHRONIC KIDNEY DISEASE STAGE I THROUGH STAGE IV, OR UNSPECIFIED: ICD-10-CM

## 2023-03-01 DIAGNOSIS — F04 WERNICKE-KORSAKOFF SYNDROME (H): ICD-10-CM

## 2023-03-01 DIAGNOSIS — F33.9 RECURRENT MAJOR DEPRESSIVE DISORDER, REMISSION STATUS UNSPECIFIED (H): ICD-10-CM

## 2023-03-01 DIAGNOSIS — E11.69 TYPE 2 DIABETES MELLITUS WITH OTHER SPECIFIED COMPLICATION, WITHOUT LONG-TERM CURRENT USE OF INSULIN (H): ICD-10-CM

## 2023-03-01 DIAGNOSIS — I73.9 PAD (PERIPHERAL ARTERY DISEASE) (H): ICD-10-CM

## 2023-03-01 DIAGNOSIS — G63 POLYNEUROPATHY ASSOCIATED WITH UNDERLYING DISEASE (H): Primary | ICD-10-CM

## 2023-03-01 PROCEDURE — 99309 SBSQ NF CARE MODERATE MDM 30: CPT | Performed by: INTERNAL MEDICINE

## 2023-03-01 NOTE — PROGRESS NOTES
[facility] GERIATRICS  REGULATORY VISIT  March 1, 2023    [facility] Medical Record Number:  [id number]  Place of Service where encounter took place:  [facility] () [[id number]]    Chief Complaint   Patient presents with     assisted Regulatory       HPI:    Román Houser is a 77 year old  (1945), who is being seen today for a federally mandated E/M visit at [facility] where he has resided since May 2022. HPI information obtained from: facility chart records, facility staff, patient report and [facility] chart review.    Today, Mr. Houser is seen after breakfast.  He is always very pleasant and conversational, but is a limited historian due to dementia (BIMS 9/15).  He reports no aches or pains, no chest pain, no trouble breathing.  I reviewed his blood pressures and blood sugars with him.  No concerns today per discussion with nursing.    ALLERGIES:  No Known Allergies     Past Medical, Surgical, Family and Social History: Reviewed and updated in EPIC.    MEDICATIONS:  Current Outpatient Medications   Medication Sig Dispense Refill     acetaminophen (TYLENOL) 500 MG tablet Take 1,000 mg by mouth 2 times daily       amLODIPine (NORVASC) 2.5 MG tablet Take 2.5 mg by mouth daily       aspirin 81 MG EC tablet Take 81 mg by mouth daily       diclofenac (VOLTAREN) 1 % topical gel Apply 2 g topically 4 times daily as needed for moderate pain APPLY TO BACK/LEGS       dulaglutide (TRULICITY) 0.75 MG/0.5ML pen Inject 0.75 mg Subcutaneous every 7 days 0.5 mL 11     gabapentin (NEURONTIN) 300 MG capsule Take 300 mg by mouth 2 times daily       gemfibrozil (LOPID) 600 MG tablet Take 1 tablet (600 mg) by mouth 2 times daily (before meals) 62 tablet 11     lidocaine (LIDODERM) 5 % patch Place 2 patches onto the skin every 24 hours To prevent lidocaine toxicity, patient should be patch free for 12 hrs daily. APPLY TO LOWER BACK       losartan (COZAAR) 100 MG tablet Take 100 mg by  "mouth daily       melatonin 3 MG tablet Take 3 mg by mouth At Bedtime       metFORMIN (GLUCOPHAGE) 1000 MG tablet Take 1,000 mg by mouth 2 times daily (with meals)       mirtazapine (REMERON) 15 MG tablet Take 15 mg by mouth At Bedtime       omeprazole (PRILOSEC) 20 MG DR capsule Take 20 mg by mouth 2 times daily       ondansetron (ZOFRAN) 4 MG tablet Take 4 mg by mouth every 6 hours as needed for nausea       polyethylene glycol (MIRALAX) 17 g packet Take 17 g by mouth daily as needed for constipation       sennosides (SENOKOT) 8.6 MG tablet Take 2 tablets by mouth 2 times daily as needed for constipation       thiamine (B-1) 100 MG tablet Take 100 mg by mouth daily       triamcinolone (KENALOG) 0.1 % external cream Apply topically 2 times daily APPLY TO RIGHT KNEE       venlafaxine (EFFEXOR) 75 MG tablet Take 75 mg by mouth daily       Medications reviewed:  Medications reconciled to facility chart and changes were made to reflect current medications as identified as above med list. Below are the changes that were made:   Medications stopped since last EPIC medication reconciliation:   Medications Discontinued During This Encounter   Medication Reason     capsaicin (ZOSTRIX) 0.025 % external cream Med Rec(No AVS / No eCancel)     Medications started since last [facility] medication reconciliation:  No orders of the defined types were placed in this encounter.      REVIEW OF SYSTEMS:  Unable to be obtained due to cognitive impairment or aphasia. [facility] 9/15     PHYSICAL EXAM:  BP (!) 139/97   Pulse 97   Temp 97.7  F (36.5  C)   Resp 20   Ht 1.753 m (5' 9\")   Wt 86.9 kg (191 lb 9.6 oz)   SpO2 98%   BMI 28.29 kg/m    Gen: sitting in a chair, alert, cooperative and in no acute distress  Resp: breathing non labored, no tachypnea  Ext: no LE edema  Neuro: CX II-XII grossly in tact; ROM in all four extremities grossly in tact  Psych: memory, judgement and insight impaired    LABS/IMAGING: Reviewed as per Epic and/or " [facility]    ASSESSMENT / PLAN:    Gustavo  Major Recurrent Depression  Clinical picture of anterograde amnesia plus imaging findings support the diagnosis. BIMS 9/15.   -- thiamine 100 mg daily   -- mirtazapine 15 mg at bedtime and venlafaxine 75 mg daily  -- supportive cares     DM, Type II  Hgb A1c 7.3 in June and 10.8 in January. New on dulaglutide. Sugars 190s (am) and 200s-300, most <300 (hs)  -- metformin 1000 mg BID  -- dulaglutide 0.75 mg subQ weekly  -- follow sugars, A1c in 3 mos      HTN  SBPs 130s. HR 80s. Weight is up about 5 lbs from May 2022. BMP ok in June.   -- amlodipine 2.5 mg daily, losartan 100 mg daily   -- follow BPs and adjust medications as needed    Peripheral Neuropathy  Underlying DM and EtOH.  -- APAP 1000 mg BID, diclofenac gel QID PRN, gabapentin 300 mg BID      CKD, Stage III  Baseline Cr not known. Was 1.1 in June.  With DM, HTN  -- avoid nephrotoxic meds  -- periodic BMP q6 mos per goals of care (he does go out to the [facility])      Electronically signed by  Mona Davalos MD

## 2023-03-01 NOTE — LETTER
3/1/2023        RE: Román Houser  [facility]  [address]  [facility] [address]        [facility] GERIATRICS  REGULATORY VISIT  March 1, 2023    [facility] Medical Record Number:  [id number]  Place of Service where encounter took place:  [facility] () [[id number]]    Chief Complaint   Patient presents with     shelter Regulatory       HPI:    Román Houser is a 77 year old  (1945), who is being seen today for a federally mandated E/M visit at [facility] where he has resided since May 2022. HPI information obtained from: facility chart records, facility staff, patient report and [facility] chart review.    Today, Mr. Houser is seen after breakfast.  He is always very pleasant and conversational, but is a limited historian due to dementia (BIMS 9/15).  He reports no aches or pains, no chest pain, no trouble breathing.  I reviewed his blood pressures and blood sugars with him.  No concerns today per discussion with nursing.    ALLERGIES:  No Known Allergies     Past Medical, Surgical, Family and Social History: Reviewed and updated in EPIC.    MEDICATIONS:  Current Outpatient Medications   Medication Sig Dispense Refill     acetaminophen (TYLENOL) 500 MG tablet Take 1,000 mg by mouth 2 times daily       amLODIPine (NORVASC) 2.5 MG tablet Take 2.5 mg by mouth daily       aspirin 81 MG EC tablet Take 81 mg by mouth daily       diclofenac (VOLTAREN) 1 % topical gel Apply 2 g topically 4 times daily as needed for moderate pain APPLY TO BACK/LEGS       dulaglutide (TRULICITY) 0.75 MG/0.5ML pen Inject 0.75 mg Subcutaneous every 7 days 0.5 mL 11     gabapentin (NEURONTIN) 300 MG capsule Take 300 mg by mouth 2 times daily       gemfibrozil (LOPID) 600 MG tablet Take 1 tablet (600 mg) by mouth 2 times daily (before meals) 62 tablet 11     lidocaine (LIDODERM) 5 % patch Place 2 patches onto the skin every 24 hours To prevent lidocaine toxicity, patient  "should be patch free for 12 hrs daily. APPLY TO LOWER BACK       losartan (COZAAR) 100 MG tablet Take 100 mg by mouth daily       melatonin 3 MG tablet Take 3 mg by mouth At Bedtime       metFORMIN (GLUCOPHAGE) 1000 MG tablet Take 1,000 mg by mouth 2 times daily (with meals)       mirtazapine (REMERON) 15 MG tablet Take 15 mg by mouth At Bedtime       omeprazole (PRILOSEC) 20 MG DR capsule Take 20 mg by mouth 2 times daily       ondansetron (ZOFRAN) 4 MG tablet Take 4 mg by mouth every 6 hours as needed for nausea       polyethylene glycol (MIRALAX) 17 g packet Take 17 g by mouth daily as needed for constipation       sennosides (SENOKOT) 8.6 MG tablet Take 2 tablets by mouth 2 times daily as needed for constipation       thiamine (B-1) 100 MG tablet Take 100 mg by mouth daily       triamcinolone (KENALOG) 0.1 % external cream Apply topically 2 times daily APPLY TO RIGHT KNEE       venlafaxine (EFFEXOR) 75 MG tablet Take 75 mg by mouth daily       Medications reviewed:  Medications reconciled to facility chart and changes were made to reflect current medications as identified as above med list. Below are the changes that were made:   Medications stopped since last EPIC medication reconciliation:   Medications Discontinued During This Encounter   Medication Reason     capsaicin (ZOSTRIX) 0.025 % external cream Med Rec(No AVS / No eCancel)     Medications started since last [facility] medication reconciliation:  No orders of the defined types were placed in this encounter.      REVIEW OF SYSTEMS:  Unable to be obtained due to cognitive impairment or aphasia. [facility] 9/15     PHYSICAL EXAM:  BP (!) 139/97   Pulse 97   Temp 97.7  F (36.5  C)   Resp 20   Ht 1.753 m (5' 9\")   Wt 86.9 kg (191 lb 9.6 oz)   SpO2 98%   BMI 28.29 kg/m    Gen: sitting in a chair, alert, cooperative and in no acute distress  Resp: breathing non labored, no tachypnea  Ext: no LE edema  Neuro: CX II-XII grossly in tact; ROM in all four extremities " grossly in tact  Psych: memory, judgement and insight impaired    LABS/IMAGING: Reviewed as per Epic and/or [facility]    ASSESSMENT / PLAN:    Gustavo  Major Recurrent Depression  Clinical picture of anterograde amnesia plus imaging findings support the diagnosis. BIMS 9/15.   -- thiamine 100 mg daily   -- mirtazapine 15 mg at bedtime and venlafaxine 75 mg daily  -- supportive cares     DM, Type II  Hgb A1c 7.3 in June and 10.8 in January. New on dulaglutide. Sugars 190s (am) and 200s-300, most <300 (hs)  -- metformin 1000 mg BID  -- dulaglutide 0.75 mg subQ weekly  -- follow sugars, A1c in 3 mos      HTN  SBPs 130s. HR 80s. Weight is up about 5 lbs from May 2022. BMP ok in June.   -- amlodipine 2.5 mg daily, losartan 100 mg daily   -- follow BPs and adjust medications as needed    Peripheral Neuropathy  Underlying DM and EtOH.  -- APAP 1000 mg BID, diclofenac gel QID PRN, gabapentin 300 mg BID      CKD, Stage III  Baseline Cr not known. Was 1.1 in June.  With DM, HTN  -- avoid nephrotoxic meds  -- periodic BMP q6 mos per goals of care (he does go out to the [facility])      Electronically signed by  Mona Davalos MD                Sincerely,        Mona Davalos MD

## 2023-03-03 PROBLEM — E11.69 TYPE 2 DIABETES MELLITUS WITH OTHER SPECIFIED COMPLICATION, WITHOUT LONG-TERM CURRENT USE OF INSULIN (H): Status: ACTIVE | Noted: 2023-03-03

## 2023-03-03 PROBLEM — I73.9 PAD (PERIPHERAL ARTERY DISEASE) (H): Status: ACTIVE | Noted: 2023-03-03

## 2023-03-03 PROBLEM — F33.9 RECURRENT MAJOR DEPRESSIVE DISORDER, REMISSION STATUS UNSPECIFIED (H): Status: ACTIVE | Noted: 2023-03-03

## 2023-03-03 PROBLEM — G63 POLYNEUROPATHY ASSOCIATED WITH UNDERLYING DISEASE (H): Status: ACTIVE | Noted: 2023-03-03

## 2023-03-03 PROBLEM — F04 WERNICKE-KORSAKOFF SYNDROME (H): Status: ACTIVE | Noted: 2023-03-03

## 2023-03-03 PROBLEM — N18.30 STAGE 3 CHRONIC KIDNEY DISEASE, UNSPECIFIED WHETHER STAGE 3A OR 3B CKD (H): Status: ACTIVE | Noted: 2023-03-03

## 2023-04-12 ENCOUNTER — TELEPHONE (OUTPATIENT)
Dept: GERIATRICS | Facility: CLINIC | Age: 78
End: 2023-04-12
Payer: COMMERCIAL

## 2023-04-12 NOTE — TELEPHONE ENCOUNTER
[facility] NON-EMERGENT  ENCOUNTER    Román Houser is a 77 year old  (1945), phone call received today regarding:     Disposition    Pt has several patches of dry, flaky skin. Hx of psoriasis.    ORDER given to SNF from Racquel Cunningham NP:    Triamcinolone 0.1% Cream to affected areas TID until symptoms resolve then BID PRN      Electronically signed by:   Peggy Khalil RN

## 2023-04-13 DIAGNOSIS — E11.65 TYPE 2 DIABETES MELLITUS WITH HYPERGLYCEMIA, WITHOUT LONG-TERM CURRENT USE OF INSULIN (H): ICD-10-CM

## 2023-04-13 RX ORDER — DULAGLUTIDE 0.75 MG/.5ML
INJECTION, SOLUTION SUBCUTANEOUS
Qty: 2 ML | Refills: 2 | Status: SHIPPED | OUTPATIENT
Start: 2023-04-13 | End: 2023-06-03 | Stop reason: DRUGHIGH

## 2023-05-05 ENCOUNTER — DOCUMENTATION ONLY (OUTPATIENT)
Dept: GERIATRICS | Facility: CLINIC | Age: 78
End: 2023-05-05

## 2023-05-05 NOTE — PROGRESS NOTES
Patient due for regulatory visit in LTC - resident to be seen on 5/12/23. Chart review completed today. Please obtain the following labs in preparation for the visit:    Labs 5/9/23:    FLP - Dx HLD    Hgb A1c - Dx DM2    Dr. Racquel Cunningham, APRN, DNP, AGNP-BC, PMHNP-BC  [facility]  Office Hours: Tues-Fri [phone]  Office: [address] [place] [address]  Fax - [phone]  Office/Triage Phone - [phone]      Email: [email or web address]

## 2023-05-08 ENCOUNTER — LAB REQUISITION (OUTPATIENT)
Dept: LAB | Facility: CLINIC | Age: 78
End: 2023-05-08

## 2023-05-08 DIAGNOSIS — E11.40 TYPE 2 DIABETES MELLITUS WITH DIABETIC NEUROPATHY, UNSPECIFIED (H): ICD-10-CM

## 2023-05-08 DIAGNOSIS — E78.5 HYPERLIPIDEMIA, UNSPECIFIED: ICD-10-CM

## 2023-05-09 ENCOUNTER — TELEPHONE (OUTPATIENT)
Dept: GERIATRICS | Facility: CLINIC | Age: 78
End: 2023-05-09
Payer: COMMERCIAL

## 2023-05-09 DIAGNOSIS — E78.2 MIXED HYPERLIPIDEMIA: Primary | ICD-10-CM

## 2023-05-09 LAB
CHOLEST SERPL-MCNC: 182 MG/DL
HBA1C MFR BLD: 9.7 %
HDLC SERPL-MCNC: 22 MG/DL
LDLC SERPL CALC-MCNC: 101 MG/DL
NONHDLC SERPL-MCNC: 160 MG/DL
TRIGL SERPL-MCNC: 294 MG/DL

## 2023-05-09 PROCEDURE — 36415 COLL VENOUS BLD VENIPUNCTURE: CPT | Performed by: NURSE PRACTITIONER

## 2023-05-09 PROCEDURE — 80061 LIPID PANEL: CPT | Performed by: NURSE PRACTITIONER

## 2023-05-09 PROCEDURE — 83036 HEMOGLOBIN GLYCOSYLATED A1C: CPT | Performed by: NURSE PRACTITIONER

## 2023-05-09 PROCEDURE — P9603 ONE-WAY ALLOW PRORATED MILES: HCPCS | Performed by: NURSE PRACTITIONER

## 2023-05-09 RX ORDER — ATORVASTATIN CALCIUM 10 MG/1
5 TABLET, FILM COATED ORAL DAILY
Qty: 31 TABLET | Refills: 11 | Status: SHIPPED | OUTPATIENT
Start: 2023-05-09

## 2023-05-09 NOTE — TELEPHONE ENCOUNTER
[facility] Geriatrics Lab Note     Provider: LONG Childers DNP  Facility: [facility] Facility Type:  LTC    No Known Allergies    Labs Reviewed by provider: lipids, HgbA1c     Verbal Order/Direction given by Provider: NP to address labs when at the facility on 5/12/23.      Provider giving Order:  LONG Childers DNP    Verbal Order given to: Andreas([phone])    Gomez Lowery RN

## 2023-05-09 NOTE — PROGRESS NOTES
Labs today as noted:  Recent Labs   Lab Test 05/09/23  0525 01/23/23  0601   CHOL 182 114   HDL 22* 22*   * 25   TRIG 294* 333*     Orders:    Start Atorvastatin 5mg PO at bedtime    Recheck FLP on 8/15/23  o Dx HLD    Electronically signed:  Dr. Racquel Cunningham, APRN, DNP, AGNP-BC, PMHNP-BC  [facility]  Office Hours: Tues-Fri [phone]  Office: [address] [place] [address]  Fax - [phone]  Office/Triage Phone - [phone]      Email: [email or web address]

## 2023-05-12 ENCOUNTER — NURSING HOME VISIT (OUTPATIENT)
Dept: GERIATRICS | Facility: CLINIC | Age: 78
End: 2023-05-12
Payer: COMMERCIAL

## 2023-05-12 VITALS
DIASTOLIC BLOOD PRESSURE: 78 MMHG | HEART RATE: 92 BPM | RESPIRATION RATE: 18 BRPM | BODY MASS INDEX: 27.4 KG/M2 | OXYGEN SATURATION: 97 % | TEMPERATURE: 97.8 F | HEIGHT: 69 IN | WEIGHT: 185 LBS | SYSTOLIC BLOOD PRESSURE: 121 MMHG

## 2023-05-12 DIAGNOSIS — E78.2 MIXED HYPERLIPIDEMIA: ICD-10-CM

## 2023-05-12 DIAGNOSIS — E11.69 TYPE 2 DIABETES MELLITUS WITH OTHER SPECIFIED COMPLICATION, WITHOUT LONG-TERM CURRENT USE OF INSULIN (H): ICD-10-CM

## 2023-05-12 DIAGNOSIS — F10.96 WERNICKE-KORSAKOFF SYNDROME (ALCOHOLIC) (H): ICD-10-CM

## 2023-05-12 DIAGNOSIS — F43.10 PTSD (POST-TRAUMATIC STRESS DISORDER): ICD-10-CM

## 2023-05-12 DIAGNOSIS — F10.20 ALCOHOL USE DISORDER, MODERATE, DEPENDENCE (H): Primary | ICD-10-CM

## 2023-05-12 PROCEDURE — 99309 SBSQ NF CARE MODERATE MDM 30: CPT | Performed by: NURSE PRACTITIONER

## 2023-05-12 NOTE — PROGRESS NOTES
[facility] GERIATRICS  Chief Complaint   Patient presents with     longterm Regulatory     [place] Medical Record Number:  [id number]  Place of Service where encounter took place:  [facility] () [[id number]]    HPI:    Román Houser  is 77 year old (1945), who is being seen today for a federally mandated E/M visit. Today's concerns are:     Alcohol use disorder, moderate, dependence (H)  Wernicke-Korsakoff syndrome (alcoholic) (H)  PTSD (post-traumatic stress disorder)  Mixed hyperlipidemia  Type 2 diabetes mellitus with other specified complication, without long-term current use of insulin (H)     Patient seen today for a regulatory visit in [facility]. Patient notes he is doing well - he offers no acute concerns or complaints today. He is sitting in day room listening to music with other residents. Appetite is doing well. He is sleeping well. Denies CP, palpitations, fatigue, nausea, vomiting, increased SOB/HUANG, fever, chills, and/or b/b concerns today.    Writer received following medication recommendations from [id number] Pharmacist:  Assessment/Recommendations:  1. Patient on Remeron 15mg, Venlafaxine 75mg and Melatonin 3mg PO at bedtime - weight stable, BIMs - 09/15, PHQ9 00/27. Recommending GDR of Remeron to 7.5mg.  2. Patient on Omeprazole 20mg PO BID - Recommendation to qDay dosing to decrease risk for side effects     Pharmacists - recommendations addressed below        ALLERGIES:Patient has no known allergies.  PAST MEDICAL HISTORY:   Past Medical History:   Diagnosis Date     AAA (abdominal aortic aneurysm)      Bilateral carpal tunnel syndrome      Cataract      Depression      Epiretinal membrane      ETOH abuse      Hip pain      Psoriasis      PTSD (post-traumatic stress disorder)      Sleep apnea      PAST SURGICAL HISTORY:   has a past surgical history that includes CE IOL LEFT EYE.  FAMILY HISTORY: family history is not on file.  SOCIAL HISTORY:  reports that he has quit smoking.  He does not have any smokeless tobacco history on file.    MEDICATIONS:  MED REC REQUIRED  Post Medication Reconciliation Status: patient was not discharged from an inpatient facility or TCU         Review of your medicines          Accurate as of May 12, 2023  2:16 PM. If you have any questions, ask your nurse or doctor.            CONTINUE these medicines which have NOT CHANGED      Dose / Directions   acetaminophen 500 MG tablet  Commonly known as: TYLENOL      Dose: 1,000 mg  Take 1,000 mg by mouth 2 times daily  Refills: 0     amLODIPine 2.5 MG tablet  Commonly known as: NORVASC      Dose: 2.5 mg  Take 2.5 mg by mouth daily  Refills: 0     aspirin 81 MG EC tablet      Dose: 81 mg  Take 81 mg by mouth daily  Refills: 0     atorvastatin 10 MG tablet  Commonly known as: LIPITOR  Used for: Mixed hyperlipidemia      Dose: 5 mg  Take 0.5 tablets (5 mg) by mouth daily  Quantity: 31 tablet  Refills: 11     diclofenac 1 % topical gel  Commonly known as: VOLTAREN      Dose: 2 g  Apply 2 g topically 4 times daily as needed for moderate pain APPLY TO BACK/LEGS  Refills: 0     gabapentin 300 MG capsule  Commonly known as: NEURONTIN      Dose: 300 mg  Take 300 mg by mouth 2 times daily  Refills: 0     gemfibrozil 600 MG tablet  Commonly known as: LOPID  Used for: High blood triglycerides      Dose: 600 mg  Take 1 tablet (600 mg) by mouth 2 times daily (before meals)  Quantity: 62 tablet  Refills: 11     lidocaine 5 % patch  Commonly known as: LIDODERM      Dose: 2 patch  Place 2 patches onto the skin every 24 hours To prevent lidocaine toxicity, patient should be patch free for 12 hrs daily. APPLY TO LOWER BACK  Refills: 0     losartan 100 MG tablet  Commonly known as: COZAAR      Dose: 100 mg  Take 100 mg by mouth daily  Refills: 0     melatonin 3 MG tablet      Dose: 3 mg  Take 3 mg by mouth At Bedtime  Refills: 0     metFORMIN 1000 MG tablet  Commonly known as: GLUCOPHAGE      Dose: 1,000 mg  Take 1,000 mg by mouth 2  "times daily (with meals)  Refills: 0     mirtazapine 15 MG tablet  Commonly known as: REMERON      Dose: 15 mg  Take 15 mg by mouth At Bedtime  Refills: 0     omeprazole 20 MG DR capsule  Commonly known as: priLOSEC      Dose: 20 mg  Take 20 mg by mouth 2 times daily  Refills: 0     ondansetron 4 MG tablet  Commonly known as: ZOFRAN      Dose: 4 mg  Take 4 mg by mouth every 6 hours as needed for nausea  Refills: 0     polyethylene glycol 17 g packet  Commonly known as: MIRALAX      Dose: 17 g  Take 17 g by mouth daily as needed for constipation  Refills: 0     sennosides 8.6 MG tablet  Commonly known as: SENOKOT      Dose: 2 tablet  Take 2 tablets by mouth 2 times daily as needed for constipation  Refills: 0     thiamine 100 MG tablet  Commonly known as: B-1      Dose: 100 mg  Take 100 mg by mouth daily  Refills: 0     triamcinolone 0.1 % external cream  Commonly known as: KENALOG      Apply topically 2 times daily APPLY TO RIGHT KNEE  Refills: 0     Trulicity 0.75 MG/0.5ML pen  Used for: Type 2 diabetes mellitus with hyperglycemia, without long-term current use of insulin (H)  Generic drug: dulaglutide      INJECT 0.5 ML (0.75 MG) SUBCUTANEOUS EVERY 7 DAYS  Quantity: 2 mL  Refills: 2     venlafaxine 75 MG tablet  Commonly known as: EFFEXOR      Dose: 75 mg  Take 75 mg by mouth daily  Refills: 0             Case Management:  I have reviewed the care plan and MDS and do agree with the plan. Patient's desire to return to the community is present, but is not able due to care needs . Information reviewed:  Medications, vital signs, orders, and nursing notes.    ROS:  10 point ROS of systems including Constitutional, Eyes, Respiratory, Cardiovascular, Gastroenterology, Genitourinary, Integumentary, Musculoskeletal, Psychiatric were all negative except for pertinent positives noted in my HPI.    Vitals:  /78   Pulse 92   Temp 97.8  F (36.6  C)   Resp 18   Ht 1.753 m (5' 9\")   Wt 83.9 kg (185 lb)   SpO2 97%   " BMI 27.32 kg/m    Body mass index is 27.32 kg/m .  Exam:  GENERAL APPEARANCE:  Alert, in no distress, appears healthy, cooperative, elderly male sitting in chair in day room  RESP: Respiratory effort normal, no respiratory distress  M/S:   Gait and station - RADHA 2/2 patient sitting in chair; Digits and nails abnormal - arthritic changes present  SKIN:  Inspection of visible skin and subcutaneous tissue baseline, Palpation of visible skin and subcutaneous tissue baseline, skin thin, fragile and dry  NEURO:   Cranial nerves 2-12 are normal tested and grossly at patient's baseline  PSYCH:  oriented to 1-2, insight and judgement impaired, memory impaired, affect and mood normal    Lab/Diagnostic data:   Recent labs in [facility] reviewed by me today.    CBC RESULTS: Recent Labs   Lab Test 06/10/22  0824   WBC 7.0   RBC 4.57   HGB 14.6   HCT 45.4   MCV 99   MCH 31.9   MCHC 32.2   RDW 13.2          Last Basic Metabolic Panel:  Recent Labs   Lab Test 06/10/22  0824      POTASSIUM 4.7   CHLORIDE 102   MARIA ISABEL 10.0   CO2 27   BUN 23   CR 1.14   *       Liver Function Studies -   Recent Labs   Lab Test 06/10/22  0824   PROTTOTAL 7.8   ALBUMIN 4.0   BILITOTAL 0.6   ALKPHOS 103   AST 20   ALT 31       Lab Results   Component Value Date    A1C 9.7 05/09/2023    A1C 10.8 01/23/2023     Recent Labs   Lab Test 05/09/23  0525 01/23/23  0601   CHOL 182 114   HDL 22* 22*   * 25   TRIG 294* 333*         ASSESSMENT/PLAN  (F10.20) Alcohol use disorder, moderate, dependence (H)  (primary encounter diagnosis)  (F10.96) Wernicke-Korsakoff syndrome (alcoholic) (H)  Comment: Chronic - stable, Resident now residing in [facility] with 24h care from unit staff - no use ETOH use since admission.  Plan:   -CMP and Thiamine level 8/15/23  -Continue Thiamine as ordered      (F43.10) PTSD (post-traumatic stress disorder)  Comment: Chronic - stable. No noted active symptoms noted since admission. No c/o active PTSD.  Plan:    -Ongoing close monitoring  -Quarterly PHQ9  -Decrease Remeron to 7.5mg PO at bedtime as noted above  -Continue Melatonin at bedtime   -Continue Effexor as ordered      (E78.2) Mixed hyperlipidemia  Comment: Chronic - unstable. Recent FLP as noted above - added Atorvastatin 5mg PO at bedtime on 5/9/23  Plan:   -Continue Atorvastatin  -Continue Gemfibrozil   -Recheck FLP on 8/15/23    (E11.69) Type 2 diabetes mellitus with other specified complication, without long-term current use of insulin (H)  Comment: Chronic - unstable. Recent A1c elevated as noted above. Recent BG levels as noted below. Decrease noted with addition of Trulicity.   Plan:   -Increase Trulicity to 1.5mg subcutaneous qWeek  -Recheck Hgb A1c on 8/15/23  -Ongoing BG checks as ordered            Electronically signed by:  Dr. Racquel Cunningham, APRN, DNP, AGNP-BC, PMHNP-BC  [facility]  Office Hours: Tues-Fri [phone]  Office: [address] [place] [address]  Fax - [phone]  Office/Triage Phone - [phone]      Email: [email or web address]

## 2023-05-12 NOTE — LETTER
5/12/2023        RE: Román Houser  [facility]  [address]  [facility] [address]        [facility] GERIATRICS  Chief Complaint   Patient presents with     residential Regulatory     [place] Medical Record Number:  [id number]  Place of Service where encounter took place:  [facility] () [[id number]]    HPI:    Román Houser  is 77 year old (1945), who is being seen today for a federally mandated E/M visit. Today's concerns are:     Alcohol use disorder, moderate, dependence (H)  Wernicke-Korsakoff syndrome (alcoholic) (H)  PTSD (post-traumatic stress disorder)  Mixed hyperlipidemia  Type 2 diabetes mellitus with other specified complication, without long-term current use of insulin (H)     Patient seen today for a regulatory visit in [facility]. Patient notes he is doing well - he offers no acute concerns or complaints today. He is sitting in day room listening to music with other residents. Appetite is doing well. He is sleeping well. Denies CP, palpitations, fatigue, nausea, vomiting, increased SOB/HUANG, fever, chills, and/or b/b concerns today.    Writer received following medication recommendations from [employer] Pharmacist:  Assessment/Recommendations:  1. Patient on Remeron 15mg, Venlafaxine 75mg and Melatonin 3mg PO at bedtime - weight stable, BIMs - 09/15, PHQ9 00/27. Recommending GDR of Remeron to 7.5mg.  2. Patient on Omeprazole 20mg PO BID - Recommendation to qDay dosing to decrease risk for side effects     Pharmacists - recommendations addressed below        ALLERGIES:Patient has no known allergies.  PAST MEDICAL HISTORY:   Past Medical History:   Diagnosis Date     AAA (abdominal aortic aneurysm)      Bilateral carpal tunnel syndrome      Cataract      Depression      Epiretinal membrane      ETOH abuse      Hip pain      Psoriasis      PTSD (post-traumatic stress disorder)      Sleep apnea      PAST SURGICAL HISTORY:   has a past surgical history that includes CE  IOL LEFT EYE.  FAMILY HISTORY: family history is not on file.  SOCIAL HISTORY:  reports that he has quit smoking. He does not have any smokeless tobacco history on file.    MEDICATIONS:  MED REC REQUIRED  Post Medication Reconciliation Status: patient was not discharged from an inpatient facility or TCU         Review of your medicines          Accurate as of May 12, 2023  2:16 PM. If you have any questions, ask your nurse or doctor.            CONTINUE these medicines which have NOT CHANGED      Dose / Directions   acetaminophen 500 MG tablet  Commonly known as: TYLENOL      Dose: 1,000 mg  Take 1,000 mg by mouth 2 times daily  Refills: 0     amLODIPine 2.5 MG tablet  Commonly known as: NORVASC      Dose: 2.5 mg  Take 2.5 mg by mouth daily  Refills: 0     aspirin 81 MG EC tablet      Dose: 81 mg  Take 81 mg by mouth daily  Refills: 0     atorvastatin 10 MG tablet  Commonly known as: LIPITOR  Used for: Mixed hyperlipidemia      Dose: 5 mg  Take 0.5 tablets (5 mg) by mouth daily  Quantity: 31 tablet  Refills: 11     diclofenac 1 % topical gel  Commonly known as: VOLTAREN      Dose: 2 g  Apply 2 g topically 4 times daily as needed for moderate pain APPLY TO BACK/LEGS  Refills: 0     gabapentin 300 MG capsule  Commonly known as: NEURONTIN      Dose: 300 mg  Take 300 mg by mouth 2 times daily  Refills: 0     gemfibrozil 600 MG tablet  Commonly known as: LOPID  Used for: High blood triglycerides      Dose: 600 mg  Take 1 tablet (600 mg) by mouth 2 times daily (before meals)  Quantity: 62 tablet  Refills: 11     lidocaine 5 % patch  Commonly known as: LIDODERM      Dose: 2 patch  Place 2 patches onto the skin every 24 hours To prevent lidocaine toxicity, patient should be patch free for 12 hrs daily. APPLY TO LOWER BACK  Refills: 0     losartan 100 MG tablet  Commonly known as: COZAAR      Dose: 100 mg  Take 100 mg by mouth daily  Refills: 0     melatonin 3 MG tablet      Dose: 3 mg  Take 3 mg by mouth At  Bedtime  Refills: 0     metFORMIN 1000 MG tablet  Commonly known as: GLUCOPHAGE      Dose: 1,000 mg  Take 1,000 mg by mouth 2 times daily (with meals)  Refills: 0     mirtazapine 15 MG tablet  Commonly known as: REMERON      Dose: 15 mg  Take 15 mg by mouth At Bedtime  Refills: 0     omeprazole 20 MG DR capsule  Commonly known as: priLOSEC      Dose: 20 mg  Take 20 mg by mouth 2 times daily  Refills: 0     ondansetron 4 MG tablet  Commonly known as: ZOFRAN      Dose: 4 mg  Take 4 mg by mouth every 6 hours as needed for nausea  Refills: 0     polyethylene glycol 17 g packet  Commonly known as: MIRALAX      Dose: 17 g  Take 17 g by mouth daily as needed for constipation  Refills: 0     sennosides 8.6 MG tablet  Commonly known as: SENOKOT      Dose: 2 tablet  Take 2 tablets by mouth 2 times daily as needed for constipation  Refills: 0     thiamine 100 MG tablet  Commonly known as: B-1      Dose: 100 mg  Take 100 mg by mouth daily  Refills: 0     triamcinolone 0.1 % external cream  Commonly known as: KENALOG      Apply topically 2 times daily APPLY TO RIGHT KNEE  Refills: 0     Trulicity 0.75 MG/0.5ML pen  Used for: Type 2 diabetes mellitus with hyperglycemia, without long-term current use of insulin (H)  Generic drug: dulaglutide      INJECT 0.5 ML (0.75 MG) SUBCUTANEOUS EVERY 7 DAYS  Quantity: 2 mL  Refills: 2     venlafaxine 75 MG tablet  Commonly known as: EFFEXOR      Dose: 75 mg  Take 75 mg by mouth daily  Refills: 0             Case Management:  I have reviewed the care plan and MDS and do agree with the plan. Patient's desire to return to the community is present, but is not able due to care needs . Information reviewed:  Medications, vital signs, orders, and nursing notes.    ROS:  10 point ROS of systems including Constitutional, Eyes, Respiratory, Cardiovascular, Gastroenterology, Genitourinary, Integumentary, Musculoskeletal, Psychiatric were all negative except for pertinent positives noted in my  "HPI.    Vitals:  /78   Pulse 92   Temp 97.8  F (36.6  C)   Resp 18   Ht 1.753 m (5' 9\")   Wt 83.9 kg (185 lb)   SpO2 97%   BMI 27.32 kg/m    Body mass index is 27.32 kg/m .  Exam:  GENERAL APPEARANCE:  Alert, in no distress, appears healthy, cooperative, elderly male sitting in chair in day room  RESP: Respiratory effort normal, no respiratory distress  M/S:   Gait and station - RADHA 2/2 patient sitting in chair; Digits and nails abnormal - arthritic changes present  SKIN:  Inspection of visible skin and subcutaneous tissue baseline, Palpation of visible skin and subcutaneous tissue baseline, skin thin, fragile and dry  NEURO:   Cranial nerves 2-12 are normal tested and grossly at patient's baseline  PSYCH:  oriented to 1-2, insight and judgement impaired, memory impaired, affect and mood normal    Lab/Diagnostic data:   Recent labs in [facility] reviewed by me today.    CBC RESULTS: Recent Labs   Lab Test 06/10/22  0824   WBC 7.0   RBC 4.57   HGB 14.6   HCT 45.4   MCV 99   MCH 31.9   MCHC 32.2   RDW 13.2          Last Basic Metabolic Panel:  Recent Labs   Lab Test 06/10/22  0824      POTASSIUM 4.7   CHLORIDE 102   MARIA ISABEL 10.0   CO2 27   BUN 23   CR 1.14   *       Liver Function Studies -   Recent Labs   Lab Test 06/10/22  0824   PROTTOTAL 7.8   ALBUMIN 4.0   BILITOTAL 0.6   ALKPHOS 103   AST 20   ALT 31       Lab Results   Component Value Date    A1C 9.7 05/09/2023    A1C 10.8 01/23/2023     Recent Labs   Lab Test 05/09/23  0525 01/23/23  0601   CHOL 182 114   HDL 22* 22*   * 25   TRIG 294* 333*         ASSESSMENT/PLAN  (F10.20) Alcohol use disorder, moderate, dependence (H)  (primary encounter diagnosis)  (F10.96) Wernicke-Korsakoff syndrome (alcoholic) (H)  Comment: Chronic - stable, Resident now residing in [facility] with 24h care from unit staff - no use ETOH use since admission.  Plan:   -CMP and Thiamine level 8/15/23  -Continue Thiamine as ordered      (F43.10) PTSD " (post-traumatic stress disorder)  Comment: Chronic - stable. No noted active symptoms noted since admission. No c/o active PTSD.  Plan:   -Ongoing close monitoring  -Quarterly PHQ9  -Decrease Remeron to 7.5mg PO at bedtime as noted above  -Continue Melatonin at bedtime   -Continue Effexor as ordered      (E78.2) Mixed hyperlipidemia  Comment: Chronic - unstable. Recent FLP as noted above - added Atorvastatin 5mg PO at bedtime on 5/9/23  Plan:   -Continue Atorvastatin  -Continue Gemfibrozil   -Recheck FLP on 8/15/23    (E11.69) Type 2 diabetes mellitus with other specified complication, without long-term current use of insulin (H)  Comment: Chronic - unstable. Recent A1c elevated as noted above. Recent BG levels as noted below. Decrease noted with addition of Trulicity.   Plan:   -Increase Trulicity to 1.5mg subcutaneous qWeek  -Recheck Hgb A1c on 8/15/23  -Ongoing BG checks as ordered            Electronically signed by:  LONG Carrasco, CHAN, AGNSETH-BC, [facility]  Office Hours: Tues-Fri 0800-1730  Office: [address] [place] [address]  Fax - [phone]  Office/Triage Phone - [phone]      Email: [email or web address]             Orders:    Thiamine (Vitamin B1) level on 8/15/23 - Dx. ETOH use    Increase Trulicity to 1.5mg subcutaneous qWeek - Dx DM2    Electronically signed:  LONG Carrasco, CHAN, [facility], [facility]  Office Hours: Tues-Fri 0800-1730  Office: [address] [place] [address]  Fax - [phone]  Office/Triage Phone - [phone]      Email: [email or web address]                     Sincerely,        LONG Pennington CNP

## 2023-06-03 DIAGNOSIS — E11.65 TYPE 2 DIABETES MELLITUS WITH HYPERGLYCEMIA, WITHOUT LONG-TERM CURRENT USE OF INSULIN (H): ICD-10-CM

## 2023-06-03 NOTE — PROGRESS NOTES
Orders:    Thiamine (Vitamin B1) level on 8/15/23 - Dx. ETOH use    Increase Trulicity to 1.5mg subcutaneous qWeek - Dx DM2    Electronically signed:  Dr. Racquel Cunningham, APRN, DNP, AGNP-BC, PMHNP-BC  [facility]  Office Hours: Tues-Fri [phone]  Office: [address] [place] [address]  Fax - [phone]  Office/Triage Phone - [phone]      Email: [email or web address]

## 2023-07-19 ENCOUNTER — NURSING HOME VISIT (OUTPATIENT)
Dept: GERIATRICS | Facility: CLINIC | Age: 78
End: 2023-07-19
Payer: COMMERCIAL

## 2023-07-19 VITALS
OXYGEN SATURATION: 97 % | RESPIRATION RATE: 19 BRPM | HEIGHT: 69 IN | BODY MASS INDEX: 27.73 KG/M2 | TEMPERATURE: 97.6 F | SYSTOLIC BLOOD PRESSURE: 138 MMHG | DIASTOLIC BLOOD PRESSURE: 72 MMHG | WEIGHT: 187.2 LBS | HEART RATE: 67 BPM

## 2023-07-19 DIAGNOSIS — I10 PRIMARY HYPERTENSION: Primary | ICD-10-CM

## 2023-07-19 DIAGNOSIS — E11.69 TYPE 2 DIABETES MELLITUS WITH OTHER SPECIFIED COMPLICATION, WITHOUT LONG-TERM CURRENT USE OF INSULIN (H): ICD-10-CM

## 2023-07-19 DIAGNOSIS — F04 WERNICKE-KORSAKOFF SYNDROME (H): ICD-10-CM

## 2023-07-19 PROCEDURE — 99309 SBSQ NF CARE MODERATE MDM 30: CPT | Performed by: INTERNAL MEDICINE

## 2023-07-19 NOTE — LETTER
7/19/2023        RE: Román Houser  [facility]  [address]  [facility] [address]        [facility] GERIATRICS  REGULATORY VISIT  July 19, 2023      [facility] Medical Record Number:  [id number]  Place of Service where encounter took place:  [facility] () [[id number]]    Chief Complaint   Patient presents with     half-way Regulatory       HPI:    Román Houser is a 78 year old  (1945), who is being seen today for a federally mandated E/M visit at [facility] where he has resided since May 2022. HPI information obtained from: facility chart records, facility staff and [facility] chart review.    Today, Mr. Houser is seen in his room laying in bed sound asleep. It was after breakfast and he'd laid back down. His [employer] ball cap was over his eyes. At baseline he is a limited historian due to dementia. Resides in the memory care unit BIMS 9/15. No concerns today per discussion with nursing.     ALLERGIES:  No Known Allergies     Past Medical, Surgical, Family and Social History: Reviewed and updated in EPIC.    MEDICATIONS:  Current Outpatient Medications   Medication Sig Dispense Refill     acetaminophen (TYLENOL) 500 MG tablet Take 1,000 mg by mouth 2 times daily       amLODIPine (NORVASC) 2.5 MG tablet Take 2.5 mg by mouth daily       aspirin 81 MG EC tablet Take 81 mg by mouth daily       atorvastatin (LIPITOR) 10 MG tablet Take 0.5 tablets (5 mg) by mouth daily 31 tablet 11     diclofenac (VOLTAREN) 1 % topical gel Apply 2 g topically 4 times daily as needed for moderate pain APPLY TO BACK/LEGS       dulaglutide (TRULICITY) 1.5 MG/0.5ML pen Inject 1.5 mg Subcutaneous every 7 days 1.5 mL 11     gabapentin (NEURONTIN) 300 MG capsule Take 300 mg by mouth 2 times daily       gemfibrozil (LOPID) 600 MG tablet Take 1 tablet (600 mg) by mouth 2 times daily (before meals) 62 tablet 11     lidocaine (LIDODERM) 5 % patch Place 2 patches onto the skin every  "24 hours To prevent lidocaine toxicity, patient should be patch free for 12 hrs daily. APPLY TO LOWER BACK       losartan (COZAAR) 100 MG tablet Take 100 mg by mouth daily       melatonin 3 MG tablet Take 3 mg by mouth At Bedtime       metFORMIN (GLUCOPHAGE) 1000 MG tablet Take 1,000 mg by mouth 2 times daily (with meals)       mirtazapine (REMERON) 15 MG tablet Take 15 mg by mouth At Bedtime       omeprazole (PRILOSEC) 20 MG DR capsule Take 20 mg by mouth 2 times daily       ondansetron (ZOFRAN) 4 MG tablet Take 4 mg by mouth every 6 hours as needed for nausea       polyethylene glycol (MIRALAX) 17 g packet Take 17 g by mouth daily as needed for constipation       sennosides (SENOKOT) 8.6 MG tablet Take 2 tablets by mouth 2 times daily as needed for constipation       thiamine (B-1) 100 MG tablet Take 100 mg by mouth daily       triamcinolone (KENALOG) 0.1 % external cream Apply topically 2 times daily APPLY TO RIGHT KNEE       venlafaxine (EFFEXOR) 75 MG tablet Take 75 mg by mouth daily       Medications reviewed:  Medications reconciled to facility chart and changes were made to reflect current medications as identified as above med list. Below are the changes that were made:   Medications stopped since last EPIC medication reconciliation:   There are no discontinued medications.  Medications started since last [facility] medication reconciliation:  No orders of the defined types were placed in this encounter.    PHYSICAL EXAM:  /72   Pulse 67   Temp 97.6  F (36.4  C)   Resp 19   Ht 1.753 m (5' 9\")   Wt 84.9 kg (187 lb 3.2 oz)   SpO2 97%   BMI 27.64 kg/m    Gen: laying in bed, peacefully asleep and in no acute distress  Resp: breathing non labored, no tachypnea     LABS/IMAGING: Reviewed as per Epic and/or [facility]    ASSESSMENT / PLAN:    HTN  SBPs 130s-150s. HR 80s-90s. Weight stable in mid 180s  -- amlodipine 2.5 mg daily, losartan 100 mg daily   -- follow BPs and adjust medications as " needed    DM, Type II  Hgb A1c 7.3 in June and 10.8 in January and 9.7. Sugars 120s (am) and 160s-210s (hs)  -- metformin 1000 mg BID  -- dulaglutide 0.75 mg subQ weekly  -- follow sugars, A1c ordered for next month - would expect this to be improved with addition of dulaglutide     Gustavo  Major Recurrent Depression  Clinical picture of anterograde amnesia plus imaging findings support the diagnosis. BIMS 9/15.   -- thiamine 100 mg daily   -- mirtazapine 15 mg at bedtime and venlafaxine 75 mg daily  -- supportive cares     Electronically signed by  Mona Davalos MD              Sincerely,        Mona Davalos MD

## 2023-07-19 NOTE — PROGRESS NOTES
[facility] GERIATRICS  REGULATORY VISIT  July 19, 2023      [facility] Medical Record Number:  [id number]  Place of Service where encounter took place:  [facility] () [[id number]]    Chief Complaint   Patient presents with     correction Regulatory       HPI:    Román Houser is a 78 year old  (1945), who is being seen today for a federally mandated E/M visit at [facility] where he has resided since May 2022. HPI information obtained from: facility chart records, facility staff and [facility] chart review.    Today, Mr. Houser is seen in his room laying in bed sound asleep. It was after breakfast and he'd laid back down. His [employer] ball cap was over his eyes. At baseline he is a limited historian due to dementia. Resides in the memory care unit [facility] 9/15. No concerns today per discussion with nursing.     ALLERGIES:  No Known Allergies     Past Medical, Surgical, Family and Social History: Reviewed and updated in EPIC.    MEDICATIONS:  Current Outpatient Medications   Medication Sig Dispense Refill     acetaminophen (TYLENOL) 500 MG tablet Take 1,000 mg by mouth 2 times daily       amLODIPine (NORVASC) 2.5 MG tablet Take 2.5 mg by mouth daily       aspirin 81 MG EC tablet Take 81 mg by mouth daily       atorvastatin (LIPITOR) 10 MG tablet Take 0.5 tablets (5 mg) by mouth daily 31 tablet 11     diclofenac (VOLTAREN) 1 % topical gel Apply 2 g topically 4 times daily as needed for moderate pain APPLY TO BACK/LEGS       dulaglutide (TRULICITY) 1.5 MG/0.5ML pen Inject 1.5 mg Subcutaneous every 7 days 1.5 mL 11     gabapentin (NEURONTIN) 300 MG capsule Take 300 mg by mouth 2 times daily       gemfibrozil (LOPID) 600 MG tablet Take 1 tablet (600 mg) by mouth 2 times daily (before meals) 62 tablet 11     lidocaine (LIDODERM) 5 % patch Place 2 patches onto the skin every 24 hours To prevent lidocaine toxicity, patient should be patch free for 12 hrs daily. APPLY TO LOWER BACK    "    losartan (COZAAR) 100 MG tablet Take 100 mg by mouth daily       melatonin 3 MG tablet Take 3 mg by mouth At Bedtime       metFORMIN (GLUCOPHAGE) 1000 MG tablet Take 1,000 mg by mouth 2 times daily (with meals)       mirtazapine (REMERON) 15 MG tablet Take 15 mg by mouth At Bedtime       omeprazole (PRILOSEC) 20 MG DR capsule Take 20 mg by mouth 2 times daily       ondansetron (ZOFRAN) 4 MG tablet Take 4 mg by mouth every 6 hours as needed for nausea       polyethylene glycol (MIRALAX) 17 g packet Take 17 g by mouth daily as needed for constipation       sennosides (SENOKOT) 8.6 MG tablet Take 2 tablets by mouth 2 times daily as needed for constipation       thiamine (B-1) 100 MG tablet Take 100 mg by mouth daily       triamcinolone (KENALOG) 0.1 % external cream Apply topically 2 times daily APPLY TO RIGHT KNEE       venlafaxine (EFFEXOR) 75 MG tablet Take 75 mg by mouth daily       Medications reviewed:  Medications reconciled to facility chart and changes were made to reflect current medications as identified as above med list. Below are the changes that were made:   Medications stopped since last EPIC medication reconciliation:   There are no discontinued medications.  Medications started since last [facility] medication reconciliation:  No orders of the defined types were placed in this encounter.    PHYSICAL EXAM:  /72   Pulse 67   Temp 97.6  F (36.4  C)   Resp 19   Ht 1.753 m (5' 9\")   Wt 84.9 kg (187 lb 3.2 oz)   SpO2 97%   BMI 27.64 kg/m    Gen: laying in bed, peacefully asleep and in no acute distress  Resp: breathing non labored, no tachypnea     LABS/IMAGING: Reviewed as per Epic and/or [facility]    ASSESSMENT / PLAN:    HTN  SBPs 130s-150s. HR 80s-90s. Weight stable in mid 180s  -- amlodipine 2.5 mg daily, losartan 100 mg daily   -- follow BPs and adjust medications as needed    DM, Type II  Hgb A1c 7.3 in June and 10.8 in January and 9.7. Sugars 120s (am) and 160s-210s (hs)  -- " metformin 1000 mg BID  -- dulaglutide 0.75 mg subQ weekly  -- follow sugars, A1c ordered for next month - would expect this to be improved with addition of dulaglutide     Gustavo  Major Recurrent Depression  Clinical picture of anterograde amnesia plus imaging findings support the diagnosis. [facility] 9/15.   -- thiamine 100 mg daily   -- mirtazapine 15 mg at bedtime and venlafaxine 75 mg daily  -- supportive cares     Electronically signed by  Mona Davalos MD

## 2023-08-13 ENCOUNTER — LAB REQUISITION (OUTPATIENT)
Dept: LAB | Facility: CLINIC | Age: 78
End: 2023-08-13

## 2023-08-13 DIAGNOSIS — D64.9 ANEMIA, UNSPECIFIED: ICD-10-CM

## 2023-08-13 DIAGNOSIS — E78.5 HYPERLIPIDEMIA, UNSPECIFIED: ICD-10-CM

## 2023-08-13 DIAGNOSIS — I10 ESSENTIAL (PRIMARY) HYPERTENSION: ICD-10-CM

## 2023-08-13 DIAGNOSIS — F10.99 ALCOHOL USE, UNSPECIFIED WITH UNSPECIFIED ALCOHOL-INDUCED DISORDER (H): ICD-10-CM

## 2023-08-15 LAB
ALBUMIN SERPL BCG-MCNC: 4.5 G/DL (ref 3.5–5.2)
ALBUMIN SERPL BCG-MCNC: 4.5 G/DL (ref 3.5–5.2)
ALP SERPL-CCNC: 98 U/L (ref 40–129)
ALP SERPL-CCNC: 98 U/L (ref 40–129)
ALT SERPL W P-5'-P-CCNC: 19 U/L (ref 0–70)
ALT SERPL W P-5'-P-CCNC: 19 U/L (ref 0–70)
ANION GAP SERPL CALCULATED.3IONS-SCNC: 13 MMOL/L (ref 7–15)
AST SERPL W P-5'-P-CCNC: 25 U/L (ref 0–45)
AST SERPL W P-5'-P-CCNC: 25 U/L (ref 0–45)
BILIRUB DIRECT SERPL-MCNC: <0.2 MG/DL (ref 0–0.3)
BILIRUB SERPL-MCNC: 0.2 MG/DL
BILIRUB SERPL-MCNC: 0.2 MG/DL
BUN SERPL-MCNC: 27.7 MG/DL (ref 8–23)
CALCIUM SERPL-MCNC: 10 MG/DL (ref 8.8–10.2)
CHLORIDE SERPL-SCNC: 103 MMOL/L (ref 98–107)
CREAT SERPL-MCNC: 1.26 MG/DL (ref 0.67–1.17)
DEPRECATED HCO3 PLAS-SCNC: 22 MMOL/L (ref 22–29)
ERYTHROCYTE [DISTWIDTH] IN BLOOD BY AUTOMATED COUNT: 14.7 % (ref 10–15)
GFR SERPL CREATININE-BSD FRML MDRD: 58 ML/MIN/1.73M2
GLUCOSE SERPL-MCNC: 162 MG/DL (ref 70–99)
HBA1C MFR BLD: 8.7 %
HCT VFR BLD AUTO: 41.6 % (ref 40–53)
HGB BLD-MCNC: 12.7 G/DL (ref 13.3–17.7)
MCH RBC QN AUTO: 30.3 PG (ref 26.5–33)
MCHC RBC AUTO-ENTMCNC: 30.5 G/DL (ref 31.5–36.5)
MCV RBC AUTO: 99 FL (ref 78–100)
PLATELET # BLD AUTO: 282 10E3/UL (ref 150–450)
POTASSIUM SERPL-SCNC: 5.3 MMOL/L (ref 3.4–5.3)
PROT SERPL-MCNC: 7.7 G/DL (ref 6.4–8.3)
PROT SERPL-MCNC: 7.7 G/DL (ref 6.4–8.3)
RBC # BLD AUTO: 4.19 10E6/UL (ref 4.4–5.9)
SODIUM SERPL-SCNC: 138 MMOL/L (ref 136–145)
VIT B12 SERPL-MCNC: 425 PG/ML (ref 232–1245)
WBC # BLD AUTO: 5.7 10E3/UL (ref 4–11)

## 2023-08-15 PROCEDURE — P9603 ONE-WAY ALLOW PRORATED MILES: HCPCS | Performed by: INTERNAL MEDICINE

## 2023-08-15 PROCEDURE — 82248 BILIRUBIN DIRECT: CPT | Performed by: INTERNAL MEDICINE

## 2023-08-15 PROCEDURE — 36415 COLL VENOUS BLD VENIPUNCTURE: CPT | Performed by: INTERNAL MEDICINE

## 2023-08-15 PROCEDURE — 85014 HEMATOCRIT: CPT | Performed by: INTERNAL MEDICINE

## 2023-08-15 PROCEDURE — 83036 HEMOGLOBIN GLYCOSYLATED A1C: CPT | Performed by: INTERNAL MEDICINE

## 2023-08-15 PROCEDURE — 80053 COMPREHEN METABOLIC PANEL: CPT | Performed by: INTERNAL MEDICINE

## 2023-08-15 PROCEDURE — 82435 ASSAY OF BLOOD CHLORIDE: CPT | Performed by: INTERNAL MEDICINE

## 2023-08-15 PROCEDURE — 82607 VITAMIN B-12: CPT | Performed by: INTERNAL MEDICINE

## 2023-09-14 VITALS
DIASTOLIC BLOOD PRESSURE: 87 MMHG | WEIGHT: 183.6 LBS | SYSTOLIC BLOOD PRESSURE: 142 MMHG | BODY MASS INDEX: 27.19 KG/M2 | OXYGEN SATURATION: 97 % | HEART RATE: 95 BPM | TEMPERATURE: 96.6 F | RESPIRATION RATE: 18 BRPM | HEIGHT: 69 IN

## 2023-09-15 ENCOUNTER — NURSING HOME VISIT (OUTPATIENT)
Dept: GERIATRICS | Facility: CLINIC | Age: 78
End: 2023-09-15
Payer: COMMERCIAL

## 2023-09-15 DIAGNOSIS — I10 BENIGN ESSENTIAL HYPERTENSION: ICD-10-CM

## 2023-09-15 DIAGNOSIS — N18.31 STAGE 3A CHRONIC KIDNEY DISEASE (H): ICD-10-CM

## 2023-09-15 DIAGNOSIS — F04 WERNICKE-KORSAKOFF SYNDROME (H): ICD-10-CM

## 2023-09-15 DIAGNOSIS — E11.65 TYPE 2 DIABETES MELLITUS WITH HYPERGLYCEMIA, WITHOUT LONG-TERM CURRENT USE OF INSULIN (H): Primary | ICD-10-CM

## 2023-09-15 PROCEDURE — 99309 SBSQ NF CARE MODERATE MDM 30: CPT | Performed by: NURSE PRACTITIONER

## 2023-09-15 NOTE — LETTER
9/15/2023        RE: Román Houser  [facility]  [address]  [facility] [address]        M [facility] GERIATRICS  Chief Complaint   Patient presents with     retirement Regulatory     [place] Medical Record Number:  [id number]  Place of Service where encounter took place:  [facility] () [[id number]]    HPI:    Román Houser  is 78 year old (1945), who is being seen today for a federally mandated E/M visit. He was admitted here in May, 2022.    Today's concerns are:  Type 2 diabetes mellitus with hyperglycemia, without long-term current use of insulin (H)  Stage 3a chronic kidney disease (H)  Wernicke-Korsakoff syndrome (H)  Patient has no physical complaints, says he feels fine. Most of the conversation is spent with him asking if he is in a nursing home and if he will be staying here. He thinks he just arrived yesterday. No behavioral concerns per staff, but he does ask these questions often.   -140s/70-80s  HR 80s  Fasting -180s, subsequent readings 200s      ALLERGIES:Patient has no known allergies.  PAST MEDICAL HISTORY:   Past Medical History:   Diagnosis Date     AAA (abdominal aortic aneurysm) (H)      Bilateral carpal tunnel syndrome      Cataract      Depression      Epiretinal membrane      ETOH abuse      Hip pain      Psoriasis      PTSD (post-traumatic stress disorder)      Sleep apnea      PAST SURGICAL HISTORY:   has a past surgical history that includes CE IOL LEFT EYE.  FAMILY HISTORY: family history is not on file.  SOCIAL HISTORY:  reports that he has quit smoking. He does not have any smokeless tobacco history on file.    MEDICATIONS:  Current Outpatient Medications   Medication Sig Dispense Refill     acetaminophen (TYLENOL) 500 MG tablet Take 1,000 mg by mouth 2 times daily       amLODIPine (NORVASC) 2.5 MG tablet Take 2.5 mg by mouth daily       aspirin 81 MG EC tablet Take 81 mg by mouth daily       atorvastatin (LIPITOR) 10 MG tablet  "Take 0.5 tablets (5 mg) by mouth daily 31 tablet 11     diclofenac (VOLTAREN) 1 % topical gel Apply 2 g topically 4 times daily as needed for moderate pain APPLY TO BACK/LEGS       dulaglutide (TRULICITY) 1.5 MG/0.5ML pen Inject 1.5 mg Subcutaneous every 7 days 1.5 mL 11     gabapentin (NEURONTIN) 300 MG capsule Take 300 mg by mouth 2 times daily       gemfibrozil (LOPID) 600 MG tablet Take 1 tablet (600 mg) by mouth 2 times daily (before meals) 62 tablet 11     lidocaine (LIDODERM) 5 % patch Place 2 patches onto the skin every 24 hours To prevent lidocaine toxicity, patient should be patch free for 12 hrs daily. APPLY TO LOWER BACK       losartan (COZAAR) 100 MG tablet Take 100 mg by mouth daily       melatonin 3 MG tablet Take 3 mg by mouth At Bedtime       metFORMIN (GLUCOPHAGE) 1000 MG tablet Take 1,000 mg by mouth 2 times daily (with meals)       omeprazole (PRILOSEC) 20 MG DR capsule Take 20 mg by mouth 2 times daily       ondansetron (ZOFRAN) 4 MG tablet Take 4 mg by mouth every 6 hours as needed for nausea       polyethylene glycol (MIRALAX) 17 g packet Take 17 g by mouth daily as needed for constipation       sennosides (SENOKOT) 8.6 MG tablet Take 2 tablets by mouth 2 times daily as needed for constipation       thiamine (B-1) 100 MG tablet Take 100 mg by mouth daily       triamcinolone (KENALOG) 0.1 % external cream Apply topically 2 times daily APPLY TO RIGHT KNEE       venlafaxine (EFFEXOR) 75 MG tablet Take 75 mg by mouth daily         ROS:  Limited secondary to cognitive impairment but today pt reports 10 point ROS of systems including Constitutional, Eyes, Respiratory, Cardiovascular, Gastroenterology, Genitourinary, Integumentary, Musculoskeletal, Psychiatric were all negative except for pertinent positives noted in my HPI.    Vitals:  BP (!) 142/87   Pulse 95   Temp (!) 96.6  F (35.9  C)   Resp 18   Ht 1.753 m (5' 9\")   Wt 83.3 kg (183 lb 9.6 oz)   SpO2 97%   BMI 27.11 kg/m    Body mass " index is 27.11 kg/m .  Exam:  GENERAL APPEARANCE:  Alert, in no distress  ENT:  Mouth and posterior oropharynx normal, moist mucous membranes  EYES:  EOM normal, conjunctiva and lids normal  RESP:  no respiratory distress  CV:  no edema  PSYCH:  insight and judgement impaired, memory impaired , affect abnormal flat    Lab/Diagnostic data:   Lab Results   Component Value Date    A1C 8.7 08/15/2023    A1C 9.7 05/09/2023    A1C 10.8 01/23/2023    A1C 7.3 06/10/2022       ASSESSMENT/PLAN  (E11.65) Type 2 diabetes mellitus with hyperglycemia, without long-term current use of insulin (H)  (primary encounter diagnosis)  Comment: Patient is on Trulicity and metformin. Trulicity was increased in June, A1c has gone down.   Plan: Continue current POC with no changes at this time and adjustments as needed. Recheck A1c in October    (N18.31) Stage 3a chronic kidney disease (H)  Comment: Chronic Kidney Disease due to: Diabetes  and Hypertension.  Plan: Avoid nephrotoxic medications and adjust medications per renal function. Monitor renal function every 6 months. Refer to plan of care for Diabetes  and Hypertension.    (I10) Benign essential hypertension  Comment: Adequately controlled. To avoid risk of hypotension, falls, dizziness and tissue hypoperfusion, recommend  BP goal is < 150/90mmHg.  Plan: Continue current POC with no changes at this time and adjustments as needed.    (F04) Wernicke-Korsakoff syndrome (H)  Comment: Moderate cognitive impairment. Requires 24 hour skilled nursing care. HPI/ROS difficult to obtain with cognitive impairment. Expect further functional and cognitive decline.   Plan: Continue 24 hour care. Monitor weight. Monitor functional status. Monitor for behavioral disturbances.      Electronically signed by:  LONG Becerra CNP         Sincerely,        Vira Rhodes, LONG CNP

## 2023-09-15 NOTE — PROGRESS NOTES
[facility] GERIATRICS  Chief Complaint   Patient presents with    penitentiary Regulatory     [place] Medical Record Number:  [id number]  Place of Service where encounter took place:  [facility] () [[id number]]    HPI:    Román Houser  is 78 year old (1945), who is being seen today for a federally mandated E/M visit. He was admitted here in May, 2022.    Today's concerns are:  Type 2 diabetes mellitus with hyperglycemia, without long-term current use of insulin (H)  Stage 3a chronic kidney disease (H)  Wernicke-Korsakoff syndrome (H)  Patient has no physical complaints, says he feels fine. Most of the conversation is spent with him asking if he is in a nursing home and if he will be staying here. He thinks he just arrived yesterday. No behavioral concerns per staff, but he does ask these questions often.   -140s/70-80s  HR 80s  Fasting -180s, subsequent readings 200s      ALLERGIES:Patient has no known allergies.  PAST MEDICAL HISTORY:   Past Medical History:   Diagnosis Date    AAA (abdominal aortic aneurysm) (H)     Bilateral carpal tunnel syndrome     Cataract     Depression     Epiretinal membrane     ETOH abuse     Hip pain     Psoriasis     PTSD (post-traumatic stress disorder)     Sleep apnea      PAST SURGICAL HISTORY:   has a past surgical history that includes CE IOL LEFT EYE.  FAMILY HISTORY: family history is not on file.  SOCIAL HISTORY:  reports that he has quit smoking. He does not have any smokeless tobacco history on file.    MEDICATIONS:  Current Outpatient Medications   Medication Sig Dispense Refill    acetaminophen (TYLENOL) 500 MG tablet Take 1,000 mg by mouth 2 times daily      amLODIPine (NORVASC) 2.5 MG tablet Take 2.5 mg by mouth daily      aspirin 81 MG EC tablet Take 81 mg by mouth daily      atorvastatin (LIPITOR) 10 MG tablet Take 0.5 tablets (5 mg) by mouth daily 31 tablet 11    diclofenac (VOLTAREN) 1 % topical gel Apply 2 g topically 4 times daily  "as needed for moderate pain APPLY TO BACK/LEGS      dulaglutide (TRULICITY) 1.5 MG/0.5ML pen Inject 1.5 mg Subcutaneous every 7 days 1.5 mL 11    gabapentin (NEURONTIN) 300 MG capsule Take 300 mg by mouth 2 times daily      gemfibrozil (LOPID) 600 MG tablet Take 1 tablet (600 mg) by mouth 2 times daily (before meals) 62 tablet 11    lidocaine (LIDODERM) 5 % patch Place 2 patches onto the skin every 24 hours To prevent lidocaine toxicity, patient should be patch free for 12 hrs daily. APPLY TO LOWER BACK      losartan (COZAAR) 100 MG tablet Take 100 mg by mouth daily      melatonin 3 MG tablet Take 3 mg by mouth At Bedtime      metFORMIN (GLUCOPHAGE) 1000 MG tablet Take 1,000 mg by mouth 2 times daily (with meals)      omeprazole (PRILOSEC) 20 MG DR capsule Take 20 mg by mouth 2 times daily      ondansetron (ZOFRAN) 4 MG tablet Take 4 mg by mouth every 6 hours as needed for nausea      polyethylene glycol (MIRALAX) 17 g packet Take 17 g by mouth daily as needed for constipation      sennosides (SENOKOT) 8.6 MG tablet Take 2 tablets by mouth 2 times daily as needed for constipation      thiamine (B-1) 100 MG tablet Take 100 mg by mouth daily      triamcinolone (KENALOG) 0.1 % external cream Apply topically 2 times daily APPLY TO RIGHT KNEE      venlafaxine (EFFEXOR) 75 MG tablet Take 75 mg by mouth daily         ROS:  Limited secondary to cognitive impairment but today pt reports 10 point ROS of systems including Constitutional, Eyes, Respiratory, Cardiovascular, Gastroenterology, Genitourinary, Integumentary, Musculoskeletal, Psychiatric were all negative except for pertinent positives noted in my HPI.    Vitals:  BP (!) 142/87   Pulse 95   Temp (!) 96.6  F (35.9  C)   Resp 18   Ht 1.753 m (5' 9\")   Wt 83.3 kg (183 lb 9.6 oz)   SpO2 97%   BMI 27.11 kg/m    Body mass index is 27.11 kg/m .  Exam:  GENERAL APPEARANCE:  Alert, in no distress  ENT:  Mouth and posterior oropharynx normal, moist mucous " membranes  EYES:  EOM normal, conjunctiva and lids normal  RESP:  no respiratory distress  CV:  no edema  PSYCH:  insight and judgement impaired, memory impaired , affect abnormal flat    Lab/Diagnostic data:   Lab Results   Component Value Date    A1C 8.7 08/15/2023    A1C 9.7 05/09/2023    A1C 10.8 01/23/2023    A1C 7.3 06/10/2022       ASSESSMENT/PLAN  (E11.65) Type 2 diabetes mellitus with hyperglycemia, without long-term current use of insulin (H)  (primary encounter diagnosis)  Comment: Patient is on Trulicity and metformin. Trulicity was increased in June, A1c has gone down.   Plan: Continue current POC with no changes at this time and adjustments as needed. Recheck A1c in October    (N18.31) Stage 3a chronic kidney disease (H)  Comment: Chronic Kidney Disease due to: Diabetes  and Hypertension.  Plan: Avoid nephrotoxic medications and adjust medications per renal function. Monitor renal function every 6 months. Refer to plan of care for Diabetes  and Hypertension.    (I10) Benign essential hypertension  Comment: Adequately controlled. To avoid risk of hypotension, falls, dizziness and tissue hypoperfusion, recommend  BP goal is < 150/90mmHg.  Plan: Continue current POC with no changes at this time and adjustments as needed.    (F04) Wernicke-Korsakoff syndrome (H)  Comment: Moderate cognitive impairment. Requires 24 hour skilled nursing care. HPI/ROS difficult to obtain with cognitive impairment. Expect further functional and cognitive decline.   Plan: Continue 24 hour care. Monitor weight. Monitor functional status. Monitor for behavioral disturbances.      Electronically signed by:  LONG Becerra CNP

## 2023-09-18 PROBLEM — I10 BENIGN ESSENTIAL HYPERTENSION: Status: ACTIVE | Noted: 2023-09-18

## 2023-10-18 DIAGNOSIS — E78.1 HIGH BLOOD TRIGLYCERIDES: ICD-10-CM

## 2023-10-18 RX ORDER — GEMFIBROZIL 600 MG/1
600 TABLET, FILM COATED ORAL
Qty: 60 TABLET | Refills: 10 | Status: SHIPPED | OUTPATIENT
Start: 2023-10-18

## 2023-11-01 NOTE — PROGRESS NOTES
[facility] GERIATRICS  REGULATORY VISIT  November 2, 2023    [facility] Medical Record Number:  [id number]  Place of Service where encounter took place:  [facility] () [[id number]]      Chief Complaint   Patient presents with    custodial Regulatory       HPI:    Román Houser is a 78 year old  (1945), who is being seen today for a federally mandated E/M visit at [facility] where he has resided since May 2022. HPI information obtained from: facility chart records, facility staff and [facility] chart review.     Today, Mr. Houser is seen sitting in a chair after breakfast. BIMS 8/15. Nursing wanted me to look at his left armpit - he is able to tell me he has a sore there. It doesn't hurt. Nursing has been putting warm packs on it. We discussed it looks like a plugged hair follicle.     ALLERGIES:  No Known Allergies     Past Medical, Surgical, Family and Social History: Reviewed and updated in EPIC.    MEDICATIONS:  Current Outpatient Medications   Medication Sig Dispense Refill    acetaminophen (TYLENOL) 500 MG tablet Take 1,000 mg by mouth 2 times daily      amLODIPine (NORVASC) 2.5 MG tablet Take 2.5 mg by mouth daily      aspirin 81 MG EC tablet Take 81 mg by mouth daily      atorvastatin (LIPITOR) 10 MG tablet Take 0.5 tablets (5 mg) by mouth daily 31 tablet 11    diclofenac (VOLTAREN) 1 % topical gel Apply 2 g topically 4 times daily as needed for moderate pain APPLY TO BACK/LEGS      dulaglutide (TRULICITY) 1.5 MG/0.5ML pen Inject 1.5 mg Subcutaneous every 7 days 1.5 mL 11    gabapentin (NEURONTIN) 300 MG capsule Take 300 mg by mouth 2 times daily      gemfibrozil (LOPID) 600 MG tablet TAKE 1 TABLET (600 MG) BY MOUTH 2 TIMES DAILY (BEFORE MEALS) 60 tablet 10    lidocaine (LIDODERM) 5 % patch Place 2 patches onto the skin every 24 hours To prevent lidocaine toxicity, patient should be patch free for 12 hrs daily. APPLY TO LOWER BACK      losartan (COZAAR) 100 MG  "tablet Take 100 mg by mouth daily      melatonin 3 MG tablet Take 3 mg by mouth At Bedtime      metFORMIN (GLUCOPHAGE) 1000 MG tablet Take 1,000 mg by mouth 2 times daily (with meals)      omeprazole (PRILOSEC) 20 MG DR capsule Take 20 mg by mouth 2 times daily      ondansetron (ZOFRAN) 4 MG tablet Take 4 mg by mouth every 6 hours as needed for nausea      polyethylene glycol (MIRALAX) 17 g packet Take 17 g by mouth daily as needed for constipation      sennosides (SENOKOT) 8.6 MG tablet Take 2 tablets by mouth daily as needed for constipation      thiamine (B-1) 100 MG tablet Take 100 mg by mouth daily      triamcinolone (KENALOG) 0.1 % external cream Apply topically 2 times daily APPLY TO RIGHT KNEE      venlafaxine (EFFEXOR) 75 MG tablet Take 75 mg by mouth daily       Medications reviewed:  Medications reconciled to facility chart and changes were made to reflect current medications as identified as above med list. Below are the changes that were made:   Medications stopped since last EPIC medication reconciliation:   There are no discontinued medications.  Medications started since last [facility] medication reconciliation:  No orders of the defined types were placed in this encounter.    REVIEW OF SYSTEMS:  Unable to be obtained due to cognitive impairment or aphasia. [facility] 8/15.     PHYSICAL EXAM:  /89   Pulse 78   Temp (!) 96.6  F (35.9  C)   Resp 18   Ht 1.753 m (5' 9\")   Wt 83.1 kg (183 lb 3.2 oz)   SpO2 99%   BMI 27.05 kg/m    Gen: sitting in chair, alert, cooperative and in no acute distress  Resp: breathing non labored, no tachypnea   Ext: no LE edema  Neuro: CX II-XII grossly in tact; ROM in all four extremities grossly in tact  Psych: memory, judgement and insight impaired  Skin: there is a boil type       LABS/IMAGING: Reviewed as per Epic and/or [facility]    ASSESSMENT / PLAN:    Left Armpit Boil vs Plugged Hair Follicle  Does not look infected or cellulitic.   -- agree with warm heat, it " should come to a head  -- nursing to update if any concerns for infection and we can start an antibiotic     HTN  SBPs 110s-140s. HR 60s-80s. Weight down about 15 lbs from a year ago. BMP in August.   -- amlodipine 2.5 mg daily -- add hold for SBP <110  -- losartan 100 mg daily   -- follow BPs and adjust medications as needed    DM, Type II   Hgb A1c 8.7 in August. Sugars 130s-150s  (am) and 140s-180s (hs)  -- metformin 1000 mg BID  -- dulaglutide 1.5 mg subQ weekly  -- follow sugars, A1c    Electronically signed by  Mona Davalos MD

## 2023-11-02 ENCOUNTER — NURSING HOME VISIT (OUTPATIENT)
Dept: GERIATRICS | Facility: CLINIC | Age: 78
End: 2023-11-02
Payer: COMMERCIAL

## 2023-11-02 VITALS
HEIGHT: 69 IN | SYSTOLIC BLOOD PRESSURE: 136 MMHG | RESPIRATION RATE: 18 BRPM | DIASTOLIC BLOOD PRESSURE: 89 MMHG | HEART RATE: 78 BPM | TEMPERATURE: 96.6 F | OXYGEN SATURATION: 99 % | BODY MASS INDEX: 27.13 KG/M2 | WEIGHT: 183.2 LBS

## 2023-11-02 DIAGNOSIS — E11.69 TYPE 2 DIABETES MELLITUS WITH OTHER SPECIFIED COMPLICATION, WITHOUT LONG-TERM CURRENT USE OF INSULIN (H): ICD-10-CM

## 2023-11-02 DIAGNOSIS — L02.92 BOIL: Primary | ICD-10-CM

## 2023-11-02 DIAGNOSIS — I12.9 BENIGN HYPERTENSIVE KIDNEY DISEASE WITH CHRONIC KIDNEY DISEASE STAGE I THROUGH STAGE IV, OR UNSPECIFIED: ICD-10-CM

## 2023-11-02 PROCEDURE — 99309 SBSQ NF CARE MODERATE MDM 30: CPT | Performed by: INTERNAL MEDICINE

## 2023-12-29 ENCOUNTER — NURSING HOME VISIT (OUTPATIENT)
Dept: GERIATRICS | Facility: CLINIC | Age: 78
End: 2023-12-29
Payer: COMMERCIAL

## 2023-12-29 VITALS
WEIGHT: 175.6 LBS | RESPIRATION RATE: 18 BRPM | SYSTOLIC BLOOD PRESSURE: 136 MMHG | BODY MASS INDEX: 26.01 KG/M2 | HEIGHT: 69 IN | OXYGEN SATURATION: 96 % | TEMPERATURE: 96.9 F | DIASTOLIC BLOOD PRESSURE: 87 MMHG | HEART RATE: 85 BPM

## 2023-12-29 DIAGNOSIS — F04 WERNICKE-KORSAKOFF SYNDROME (H): ICD-10-CM

## 2023-12-29 DIAGNOSIS — J10.1 INFLUENZA A: Primary | ICD-10-CM

## 2023-12-29 DIAGNOSIS — E11.69 TYPE 2 DIABETES MELLITUS WITH OTHER SPECIFIED COMPLICATION, WITHOUT LONG-TERM CURRENT USE OF INSULIN (H): ICD-10-CM

## 2023-12-29 DIAGNOSIS — N18.30 STAGE 3 CHRONIC KIDNEY DISEASE, UNSPECIFIED WHETHER STAGE 3A OR 3B CKD (H): ICD-10-CM

## 2023-12-29 PROCEDURE — 99309 SBSQ NF CARE MODERATE MDM 30: CPT | Performed by: NURSE PRACTITIONER

## 2023-12-29 NOTE — LETTER
"    12/29/2023        RE: Román Houser  [facility]  [address] [facility] [address]        M [facility] GERIATRICS    Chief Complaint   Patient presents with     Nursing Home Acute     HPI:  Román Houser is a 78 year old  (1945), who is being seen today for an episodic care visit at: [facility] () [[id number]]. Today's concern is: Acute influenza A.    Román has a history of CKD stage III, type 2 diabetes, Venkat Diaz in nursing home long-term, who was recently diagnosed with influenza A.  Currently only complaining of nausea.  Nausea possibly side effect of Tamiflu versus influenza infection.  Otherwise denies fever, chills, shortness of breath, body aches or pains.  Appetite fair.  Vital signs stable.  No other concerns.    Allergies, and PMH/PSH reviewed in [facility] today.  REVIEW OF SYSTEMS:  4 point ROS including Respiratory, CV, GI and , other than that noted in the HPI,  is negative    Objective:   /87   Pulse 85   Temp 96.9  F (36.1  C)   Resp 18   Ht 1.753 m (5' 9\")   Wt 79.7 kg (175 lb 9.6 oz)   SpO2 96%   BMI 25.93 kg/m    General appearance: alert, cooperative.   Lungs: respirations unlabored.  Extremities: extremities normal, atraumatic, no cyanosis or edema  Skin: No rashes or lesions  Neurologic: oriented. No focal deficits.   Psych: interacts well with caregivers,  pleasant      Recent labs in [facility] reviewed by me today.     Assessment/Plan:  1. Influenza A    2. Wernicke-Korsakoff syndrome (H24)    3. Type 2 diabetes mellitus with other specified complication, without long-term current use of insulin (H)    4. Stage 3 chronic kidney disease, unspecified whether stage 3a or 3b CKD (H)        Influenza A: Stable.  Continue Tamiflu treatment, renal dosing.  Monitor for respiratory distress, poor appetite or lethargy.  Stable.  Resides in long-term care due to level of care needs.  Type 2 diabetes: Continues on metformin, monitor p.o. intake " during acute illness.  Stage III kidney disease: Encourage fluids during acute illness.  Tamiflu renally dosed.    Electronically signed by: Germaine Sosa CNP           Sincerely,        Germaine Sosa CNP

## 2023-12-30 NOTE — PROGRESS NOTES
"[facility] GERIATRICS    Chief Complaint   Patient presents with    Nursing Home Acute     HPI:  Román Houser is a 78 year old  (1945), who is being seen today for an episodic care visit at: [facility] () [[id number]]. Today's concern is: Acute influenza A.    Román has a history of CKD stage III, type 2 diabetes, Warnicke Korsakoff in nursing home long-term, who was recently diagnosed with influenza A.  Currently only complaining of nausea.  Nausea possibly side effect of Tamiflu versus influenza infection.  Otherwise denies fever, chills, shortness of breath, body aches or pains.  Appetite fair.  Vital signs stable.  No other concerns.    Allergies, and PMH/PSH reviewed in [facility] today.  REVIEW OF SYSTEMS:  4 point ROS including Respiratory, CV, GI and , other than that noted in the HPI,  is negative    Objective:   /87   Pulse 85   Temp 96.9  F (36.1  C)   Resp 18   Ht 1.753 m (5' 9\")   Wt 79.7 kg (175 lb 9.6 oz)   SpO2 96%   BMI 25.93 kg/m    General appearance: alert, cooperative.   Lungs: respirations unlabored.  Extremities: extremities normal, atraumatic, no cyanosis or edema  Skin: No rashes or lesions  Neurologic: oriented. No focal deficits.   Psych: interacts well with caregivers,  pleasant      Recent labs in [facility] reviewed by me today.     Assessment/Plan:  1. Influenza A    2. Wernicke-Korsakoff syndrome (H24)    3. Type 2 diabetes mellitus with other specified complication, without long-term current use of insulin (H)    4. Stage 3 chronic kidney disease, unspecified whether stage 3a or 3b CKD (H)        Influenza A: Stable.  Continue Tamiflu treatment, renal dosing.  Monitor for respiratory distress, poor appetite or lethargy.  Stable.  Resides in long-term care due to level of care needs.  Type 2 diabetes: Continues on metformin, monitor p.o. intake during acute illness.  Stage III kidney disease: Encourage fluids during acute illness.  Tamiflu renally " dosed.    Electronically signed by: Germaine Sosa, CNP

## 2024-01-19 ENCOUNTER — NURSING HOME VISIT (OUTPATIENT)
Dept: GERIATRICS | Facility: CLINIC | Age: 79
End: 2024-01-19
Payer: COMMERCIAL

## 2024-01-19 VITALS
RESPIRATION RATE: 18 BRPM | HEART RATE: 71 BPM | TEMPERATURE: 97.1 F | SYSTOLIC BLOOD PRESSURE: 145 MMHG | DIASTOLIC BLOOD PRESSURE: 90 MMHG | HEIGHT: 69 IN | BODY MASS INDEX: 25.98 KG/M2 | WEIGHT: 175.4 LBS | OXYGEN SATURATION: 97 %

## 2024-01-19 DIAGNOSIS — I10 BENIGN ESSENTIAL HYPERTENSION: ICD-10-CM

## 2024-01-19 DIAGNOSIS — E11.69 TYPE 2 DIABETES MELLITUS WITH OTHER SPECIFIED COMPLICATION, WITHOUT LONG-TERM CURRENT USE OF INSULIN (H): Primary | ICD-10-CM

## 2024-01-19 DIAGNOSIS — N18.31 STAGE 3A CHRONIC KIDNEY DISEASE (H): ICD-10-CM

## 2024-01-19 DIAGNOSIS — F10.96 WERNICKE-KORSAKOFF SYNDROME (ALCOHOLIC) (H): ICD-10-CM

## 2024-01-19 DIAGNOSIS — I73.9 PAD (PERIPHERAL ARTERY DISEASE) (H): ICD-10-CM

## 2024-01-19 DIAGNOSIS — G63 POLYNEUROPATHY ASSOCIATED WITH UNDERLYING DISEASE (H): ICD-10-CM

## 2024-01-19 PROCEDURE — 99309 SBSQ NF CARE MODERATE MDM 30: CPT | Performed by: NURSE PRACTITIONER

## 2024-01-19 NOTE — PROGRESS NOTES
[facility] GERIATRICS  Chief Complaint   Patient presents with    half-way Regulatory     [place] Medical Record Number:  [id number]  Place of Service where encounter took place:  [facility] () [[id number]]    HPI:    Román Houser  is 78 year old (1945), who is being seen today for a federally mandated E/M visit.     Today's concerns are:  Type 2 diabetes mellitus with other specified complication, without long-term current use of insulin (H)  Stage 3a chronic kidney disease (H)  Wernicke-Korsakoff syndrome (alcoholic) (H)  Polyneuropathy associated with underlying disease (H24)  PAD (peripheral artery disease) (H24)  Benign essential hypertension    As is typical, patient asks what the plan is, if he has to stay here. He does not seem as upset about today though. He says he is feeling fine. No pain at all. He had some weight loss this month, was also ill with influenza around the 1st of the year. He does not recall being ill, says he feels good now.       ALLERGIES:Patient has no known allergies.  PAST MEDICAL HISTORY:   Past Medical History:   Diagnosis Date    AAA (abdominal aortic aneurysm) (H)     Bilateral carpal tunnel syndrome     Cataract     Depression     Epiretinal membrane     ETOH abuse     Hip pain     Psoriasis     PTSD (post-traumatic stress disorder)     Sleep apnea      PAST SURGICAL HISTORY:   has a past surgical history that includes CE IOL LEFT EYE.  FAMILY HISTORY: family history is not on file.  SOCIAL HISTORY:  reports that he has quit smoking. He does not have any smokeless tobacco history on file.    MEDICATIONS:  Current Outpatient Medications   Medication Sig Dispense Refill    acetaminophen (TYLENOL) 500 MG tablet Take 1,000 mg by mouth 2 times daily      amLODIPine (NORVASC) 2.5 MG tablet Take 2.5 mg by mouth daily      aspirin 81 MG EC tablet Take 81 mg by mouth daily      atorvastatin (LIPITOR) 10 MG tablet Take 0.5 tablets (5 mg) by mouth daily 31 tablet  "11    diclofenac (VOLTAREN) 1 % topical gel Apply 2 g topically 4 times daily as needed for moderate pain APPLY TO BACK/LEGS      dulaglutide (TRULICITY) 1.5 MG/0.5ML pen Inject 1.5 mg Subcutaneous every 7 days 1.5 mL 11    gabapentin (NEURONTIN) 300 MG capsule Take 300 mg by mouth 2 times daily      gemfibrozil (LOPID) 600 MG tablet TAKE 1 TABLET (600 MG) BY MOUTH 2 TIMES DAILY (BEFORE MEALS) 60 tablet 10    lidocaine (LIDODERM) 5 % patch Place 2 patches onto the skin every 24 hours To prevent lidocaine toxicity, patient should be patch free for 12 hrs daily. APPLY TO LOWER BACK      losartan (COZAAR) 100 MG tablet Take 100 mg by mouth daily      melatonin 3 MG tablet Take 3 mg by mouth At Bedtime      metFORMIN (GLUCOPHAGE) 1000 MG tablet Take 1,000 mg by mouth 2 times daily (with meals)      omeprazole (PRILOSEC) 20 MG DR capsule Take 20 mg by mouth 2 times daily      ondansetron (ZOFRAN) 4 MG tablet Take 4 mg by mouth every 6 hours as needed for nausea      polyethylene glycol (MIRALAX) 17 g packet Take 17 g by mouth daily as needed for constipation      sennosides (SENOKOT) 8.6 MG tablet Take 2 tablets by mouth daily as needed for constipation      thiamine (B-1) 100 MG tablet Take 100 mg by mouth daily      triamcinolone (KENALOG) 0.1 % external cream Apply topically 2 times daily APPLY TO RIGHT KNEE      venlafaxine (EFFEXOR) 75 MG tablet Take 75 mg by mouth daily         ROS:  Limited secondary to cognitive impairment but today pt reports 4 point ROS including Respiratory, CV, GI and , other than that noted in the HPI,  is negative    Vitals:  BP (!) 145/90   Pulse 71   Temp 97.1  F (36.2  C)   Resp 18   Ht 1.753 m (5' 9\")   Wt 79.6 kg (175 lb 6.4 oz)   SpO2 97%   BMI 25.90 kg/m    Body mass index is 25.9 kg/m .  Exam:  GENERAL APPEARANCE:  Alert, in no distress  ENT:  Mouth and posterior oropharynx normal, moist mucous membranes  EYES:  EOM normal, conjunctiva and lids normal  RESP:  no respiratory " distress  CV:  no edema  PSYCH:  insight and judgement impaired, memory impaired , affect and mood normal    Lab/Diagnostic data:   Recent labs in [facility] reviewed by me today.     ASSESSMENT/PLAN  (E11.69) Type 2 diabetes mellitus with other specified complication, without long-term current use of insulin (H)  (primary encounter diagnosis)  Comment: Some intermittent BG 200s, but timing is sporadic. Likely adequately controlled. HgA1c <8% not recommended in elderly due to risk of hypoglycemia. Risk outweighs benefit of tighter control.   Plan: A1c next week. Adjust medication as clinically indicated.    (N18.31) Stage 3a chronic kidney disease (H)  Comment: Chronic Kidney Disease due to: Diabetes  and Hypertension.  Plan: Avoid nephrotoxic medications and adjust medications per renal function. BMP next week. Refer to plan of care for Diabetes  and Hypertension.    (F10.96) Wernicke-Korsakoff syndrome (alcoholic) (H)  Comment: Moderate impairment. Unable to manage IADLs or live independently  Plan: Continue current POC with no changes at this time and adjustments as needed.    (G63) Polyneuropathy associated with underlying disease (H24)  Comment: No reports of uncontrolled pain. No recent falls  Plan: Continue current POC with no changes at this time and adjustments as needed.    (I73.9) PAD (peripheral artery disease) (H24)  Comment:  Status:  Chronic.  PAD due to Hypertension.   Plan:  Monitor for pressure, open areas and appropriate footwear. Continue aspirin.  Podiatrist as indicated for foot care.     (I10) Benign essential hypertension  Comment: Chronic, controlled  Plan: Continue current POC with no changes at this time and adjustments as needed.      Orders:  CMP, CBC, lipid panel, A1c 1/24/24        Electronically signed by:  LONG Becerra CNP

## 2024-01-19 NOTE — LETTER
1/19/2024        RE: Román Houser  [facility]  [address]  [facility] [address]        M [facility] GERIATRICS  Chief Complaint   Patient presents with     senior living Regulatory     [place] Medical Record Number:  [id number]  Place of Service where encounter took place:  [facility] () [[id number]]    HPI:    Román Houser  is 78 year old (1945), who is being seen today for a federally mandated E/M visit.     Today's concerns are:  Type 2 diabetes mellitus with other specified complication, without long-term current use of insulin (H)  Stage 3a chronic kidney disease (H)  Wernicke-Korsakoff syndrome (alcoholic) (H)  Polyneuropathy associated with underlying disease (H24)  PAD (peripheral artery disease) (H24)  Benign essential hypertension    As is typical, patient asks what the plan is, if he has to stay here. He does not seem as upset about today though. He says he is feeling fine. No pain at all. He had some weight loss this month, was also ill with influenza around the 1st of the year. He does not recall being ill, says he feels good now.       ALLERGIES:Patient has no known allergies.  PAST MEDICAL HISTORY:   Past Medical History:   Diagnosis Date     AAA (abdominal aortic aneurysm) (H)      Bilateral carpal tunnel syndrome      Cataract      Depression      Epiretinal membrane      ETOH abuse      Hip pain      Psoriasis      PTSD (post-traumatic stress disorder)      Sleep apnea      PAST SURGICAL HISTORY:   has a past surgical history that includes CE IOL LEFT EYE.  FAMILY HISTORY: family history is not on file.  SOCIAL HISTORY:  reports that he has quit smoking. He does not have any smokeless tobacco history on file.    MEDICATIONS:  Current Outpatient Medications   Medication Sig Dispense Refill     acetaminophen (TYLENOL) 500 MG tablet Take 1,000 mg by mouth 2 times daily       amLODIPine (NORVASC) 2.5 MG tablet Take 2.5 mg by mouth daily       aspirin 81 MG  "EC tablet Take 81 mg by mouth daily       atorvastatin (LIPITOR) 10 MG tablet Take 0.5 tablets (5 mg) by mouth daily 31 tablet 11     diclofenac (VOLTAREN) 1 % topical gel Apply 2 g topically 4 times daily as needed for moderate pain APPLY TO BACK/LEGS       dulaglutide (TRULICITY) 1.5 MG/0.5ML pen Inject 1.5 mg Subcutaneous every 7 days 1.5 mL 11     gabapentin (NEURONTIN) 300 MG capsule Take 300 mg by mouth 2 times daily       gemfibrozil (LOPID) 600 MG tablet TAKE 1 TABLET (600 MG) BY MOUTH 2 TIMES DAILY (BEFORE MEALS) 60 tablet 10     lidocaine (LIDODERM) 5 % patch Place 2 patches onto the skin every 24 hours To prevent lidocaine toxicity, patient should be patch free for 12 hrs daily. APPLY TO LOWER BACK       losartan (COZAAR) 100 MG tablet Take 100 mg by mouth daily       melatonin 3 MG tablet Take 3 mg by mouth At Bedtime       metFORMIN (GLUCOPHAGE) 1000 MG tablet Take 1,000 mg by mouth 2 times daily (with meals)       omeprazole (PRILOSEC) 20 MG DR capsule Take 20 mg by mouth 2 times daily       ondansetron (ZOFRAN) 4 MG tablet Take 4 mg by mouth every 6 hours as needed for nausea       polyethylene glycol (MIRALAX) 17 g packet Take 17 g by mouth daily as needed for constipation       sennosides (SENOKOT) 8.6 MG tablet Take 2 tablets by mouth daily as needed for constipation       thiamine (B-1) 100 MG tablet Take 100 mg by mouth daily       triamcinolone (KENALOG) 0.1 % external cream Apply topically 2 times daily APPLY TO RIGHT KNEE       venlafaxine (EFFEXOR) 75 MG tablet Take 75 mg by mouth daily         ROS:  Limited secondary to cognitive impairment but today pt reports 4 point ROS including Respiratory, CV, GI and , other than that noted in the HPI,  is negative    Vitals:  BP (!) 145/90   Pulse 71   Temp 97.1  F (36.2  C)   Resp 18   Ht 1.753 m (5' 9\")   Wt 79.6 kg (175 lb 6.4 oz)   SpO2 97%   BMI 25.90 kg/m    Body mass index is 25.9 kg/m .  Exam:  GENERAL APPEARANCE:  Alert, in no " distress  ENT:  Mouth and posterior oropharynx normal, moist mucous membranes  EYES:  EOM normal, conjunctiva and lids normal  RESP:  no respiratory distress  CV:  no edema  PSYCH:  insight and judgement impaired, memory impaired , affect and mood normal    Lab/Diagnostic data:   Recent labs in [facility] reviewed by me today.     ASSESSMENT/PLAN  (E11.69) Type 2 diabetes mellitus with other specified complication, without long-term current use of insulin (H)  (primary encounter diagnosis)  Comment: Some intermittent BG 200s, but timing is sporadic. Likely adequately controlled. HgA1c <8% not recommended in elderly due to risk of hypoglycemia. Risk outweighs benefit of tighter control.   Plan: A1c next week. Adjust medication as clinically indicated.    (N18.31) Stage 3a chronic kidney disease (H)  Comment: Chronic Kidney Disease due to: Diabetes  and Hypertension.  Plan: Avoid nephrotoxic medications and adjust medications per renal function. BMP next week. Refer to plan of care for Diabetes  and Hypertension.    (F10.96) Wernicke-Korsakoff syndrome (alcoholic) (H)  Comment: Moderate impairment. Unable to manage IADLs or live independently  Plan: Continue current POC with no changes at this time and adjustments as needed.    (G63) Polyneuropathy associated with underlying disease (H24)  Comment: No reports of uncontrolled pain. No recent falls  Plan: Continue current POC with no changes at this time and adjustments as needed.    (I73.9) PAD (peripheral artery disease) (H24)  Comment:  Status:  Chronic.  PAD due to Hypertension.   Plan:  Monitor for pressure, open areas and appropriate footwear. Continue aspirin.  Podiatrist as indicated for foot care.     (I10) Benign essential hypertension  Comment: Chronic, controlled  Plan: Continue current POC with no changes at this time and adjustments as needed.      Orders:  CMP, CBC, lipid panel, A1c 1/24/24        Electronically signed by:  LONG Becerra  CNP            Sincerely,        LONG Becerra CNP

## 2024-01-22 PROBLEM — F04 WERNICKE-KORSAKOFF SYNDROME (H): Status: RESOLVED | Noted: 2023-03-03 | Resolved: 2024-01-22

## 2024-01-22 PROBLEM — F10.96 WERNICKE-KORSAKOFF SYNDROME (ALCOHOLIC) (H): Status: ACTIVE | Noted: 2024-01-22

## 2024-01-22 PROBLEM — F33.9 RECURRENT MAJOR DEPRESSIVE DISORDER, REMISSION STATUS UNSPECIFIED (H): Status: RESOLVED | Noted: 2023-03-03 | Resolved: 2024-01-22

## 2024-01-23 ENCOUNTER — LAB REQUISITION (OUTPATIENT)
Dept: LAB | Facility: CLINIC | Age: 79
End: 2024-01-23
Payer: COMMERCIAL

## 2024-01-23 DIAGNOSIS — E78.5 HYPERLIPIDEMIA, UNSPECIFIED: ICD-10-CM

## 2024-01-23 DIAGNOSIS — D64.9 ANEMIA, UNSPECIFIED: ICD-10-CM

## 2024-01-23 DIAGNOSIS — E11.40 TYPE 2 DIABETES MELLITUS WITH DIABETIC NEUROPATHY, UNSPECIFIED (H): ICD-10-CM

## 2024-01-24 ENCOUNTER — TELEPHONE (OUTPATIENT)
Dept: GERIATRICS | Facility: CLINIC | Age: 79
End: 2024-01-24

## 2024-01-24 LAB
ALBUMIN SERPL BCG-MCNC: 4.3 G/DL (ref 3.5–5.2)
ALP SERPL-CCNC: 98 U/L (ref 40–150)
ALT SERPL W P-5'-P-CCNC: 18 U/L (ref 0–70)
ANION GAP SERPL CALCULATED.3IONS-SCNC: 15 MMOL/L (ref 7–15)
AST SERPL W P-5'-P-CCNC: 26 U/L (ref 0–45)
BILIRUB SERPL-MCNC: 0.2 MG/DL
BUN SERPL-MCNC: 28.2 MG/DL (ref 8–23)
CALCIUM SERPL-MCNC: 10 MG/DL (ref 8.8–10.2)
CHLORIDE SERPL-SCNC: 105 MMOL/L (ref 98–107)
CHOLEST SERPL-MCNC: 151 MG/DL
CREAT SERPL-MCNC: 1.15 MG/DL (ref 0.67–1.17)
DEPRECATED HCO3 PLAS-SCNC: 20 MMOL/L (ref 22–29)
EGFRCR SERPLBLD CKD-EPI 2021: 65 ML/MIN/1.73M2
ERYTHROCYTE [DISTWIDTH] IN BLOOD BY AUTOMATED COUNT: 14.4 % (ref 10–15)
FASTING STATUS PATIENT QL REPORTED: ABNORMAL
GLUCOSE SERPL-MCNC: 122 MG/DL (ref 70–99)
HBA1C MFR BLD: 9 %
HCT VFR BLD AUTO: 36.1 % (ref 40–53)
HDLC SERPL-MCNC: 28 MG/DL
HGB BLD-MCNC: 11.7 G/DL (ref 13.3–17.7)
LDLC SERPL CALC-MCNC: 91 MG/DL
MCH RBC QN AUTO: 32.3 PG (ref 26.5–33)
MCHC RBC AUTO-ENTMCNC: 32.4 G/DL (ref 31.5–36.5)
MCV RBC AUTO: 100 FL (ref 78–100)
NONHDLC SERPL-MCNC: 123 MG/DL
PLATELET # BLD AUTO: 274 10E3/UL (ref 150–450)
POTASSIUM SERPL-SCNC: 4.9 MMOL/L (ref 3.4–5.3)
PROT SERPL-MCNC: 7.4 G/DL (ref 6.4–8.3)
RBC # BLD AUTO: 3.62 10E6/UL (ref 4.4–5.9)
SODIUM SERPL-SCNC: 140 MMOL/L (ref 135–145)
TRIGL SERPL-MCNC: 160 MG/DL
WBC # BLD AUTO: 5.3 10E3/UL (ref 4–11)

## 2024-01-24 PROCEDURE — 80061 LIPID PANEL: CPT | Mod: ORL | Performed by: INTERNAL MEDICINE

## 2024-01-24 PROCEDURE — 36415 COLL VENOUS BLD VENIPUNCTURE: CPT | Mod: ORL | Performed by: INTERNAL MEDICINE

## 2024-01-24 PROCEDURE — 83036 HEMOGLOBIN GLYCOSYLATED A1C: CPT | Performed by: INTERNAL MEDICINE

## 2024-01-24 PROCEDURE — 80053 COMPREHEN METABOLIC PANEL: CPT | Mod: ORL | Performed by: INTERNAL MEDICINE

## 2024-01-24 PROCEDURE — P9603 ONE-WAY ALLOW PRORATED MILES: HCPCS | Mod: ORL | Performed by: INTERNAL MEDICINE

## 2024-01-24 PROCEDURE — 85027 COMPLETE CBC AUTOMATED: CPT | Performed by: INTERNAL MEDICINE

## 2024-01-24 NOTE — TELEPHONE ENCOUNTER
[facility] Lab Note     Provider: LONG Tavera CNP   Facility: [facility] Facility Type:  LTC    No Known Allergies    Labs Reviewed by provider: CBC, A1c, Lipid, CMP    Verbal Order/Direction given by Provider: Increase dulaglutide to 3mg weekly     Provider giving Order:  LONG Tavera CNP     Verbal Order given to: Jana Khalil RN

## 2024-01-25 ENCOUNTER — LAB REQUISITION (OUTPATIENT)
Dept: LAB | Facility: CLINIC | Age: 79
End: 2024-01-25
Payer: COMMERCIAL

## 2024-01-25 ENCOUNTER — TELEPHONE (OUTPATIENT)
Dept: GERIATRICS | Facility: CLINIC | Age: 79
End: 2024-01-25
Payer: COMMERCIAL

## 2024-01-25 DIAGNOSIS — N39.0 URINARY TRACT INFECTION, SITE NOT SPECIFIED: ICD-10-CM

## 2024-01-25 LAB
ALBUMIN UR-MCNC: 100 MG/DL
APPEARANCE UR: ABNORMAL
BACTERIA #/AREA URNS HPF: ABNORMAL /HPF
BILIRUB UR QL STRIP: NEGATIVE
COLOR UR AUTO: ABNORMAL
GLUCOSE UR STRIP-MCNC: NEGATIVE MG/DL
HGB UR QL STRIP: ABNORMAL
KETONES UR STRIP-MCNC: NEGATIVE MG/DL
LEUKOCYTE ESTERASE UR QL STRIP: ABNORMAL
NITRATE UR QL: NEGATIVE
PH UR STRIP: 6 [PH] (ref 5–7)
RBC URINE: 10 /HPF
SP GR UR STRIP: 1.01 (ref 1–1.03)
SQUAMOUS EPITHELIAL: 1 /HPF
UROBILINOGEN UR STRIP-MCNC: NORMAL MG/DL
WBC CLUMPS #/AREA URNS HPF: PRESENT /HPF
WBC URINE: >182 /HPF

## 2024-01-25 PROCEDURE — 87186 SC STD MICRODIL/AGAR DIL: CPT | Mod: ORL | Performed by: INTERNAL MEDICINE

## 2024-01-25 PROCEDURE — 87086 URINE CULTURE/COLONY COUNT: CPT | Mod: ORL | Performed by: INTERNAL MEDICINE

## 2024-01-25 PROCEDURE — 81001 URINALYSIS AUTO W/SCOPE: CPT | Mod: ORL | Performed by: INTERNAL MEDICINE

## 2024-01-25 NOTE — TELEPHONE ENCOUNTER
[facility] Triage Nurse Telephone Encounter    Provider: LONG Tavera CNP   Facility: [facility] Facility Type:  LT    Caller: Andreas  Call Back Number: [phone]    Allergies:  No Known Allergies     Reason for call: Nurse is calling to report that patient has been having urinary frequency and urine is very foul smelling.  No pain or fever noted.      Verbal Order/Direction given by Provider: Ok to obtain a clean catch UA/UC.      Provider giving Order:  LONG Tavera CNP     Verbal Order given to: Andreas Lowery RN

## 2024-01-26 ENCOUNTER — TELEPHONE (OUTPATIENT)
Dept: GERIATRICS | Facility: CLINIC | Age: 79
End: 2024-01-26
Payer: COMMERCIAL

## 2024-01-26 DIAGNOSIS — N30.00 ACUTE CYSTITIS WITHOUT HEMATURIA: Primary | ICD-10-CM

## 2024-01-26 LAB — BACTERIA UR CULT: ABNORMAL

## 2024-01-26 RX ORDER — CIPROFLOXACIN 500 MG/1
500 TABLET, FILM COATED ORAL 2 TIMES DAILY
Start: 2024-01-26 | End: 2024-02-02

## 2024-01-26 NOTE — CONFIDENTIAL NOTE
Symptoms include urgency and frequency, some AMS    Plan:  Cipro 500mg BID x 7 days, update provider if not susceptible

## 2024-02-01 ENCOUNTER — DOCUMENTATION ONLY (OUTPATIENT)
Dept: GERIATRICS | Facility: CLINIC | Age: 79
End: 2024-02-01
Payer: COMMERCIAL

## 2024-02-07 NOTE — LETTER
11/2/2023        RE: Román Houser  [facility]  [address]  [facility] [address]        [facility] GERIATRICS  REGULATORY VISIT  November 2, 2023    [facility] Medical Record Number:  [id number]  Place of Service where encounter took place:  [facility] () [[id number]]      Chief Complaint   Patient presents with     long-term Regulatory       HPI:    Román Hosuer is a 78 year old  (1945), who is being seen today for a federally mandated E/M visit at [facility] where he has resided since May 2022. HPI information obtained from: facility chart records, facility staff and [facility] chart review.     Today, Mr. Houser is seen sitting in a chair after breakfast. BIMS 8/15. Nursing wanted me to look at his left armpit - he is able to tell me he has a sore there. It doesn't hurt. Nursing has been putting warm packs on it. We discussed it looks like a plugged hair follicle.     ALLERGIES:  No Known Allergies     Past Medical, Surgical, Family and Social History: Reviewed and updated in EPIC.    MEDICATIONS:  Current Outpatient Medications   Medication Sig Dispense Refill     acetaminophen (TYLENOL) 500 MG tablet Take 1,000 mg by mouth 2 times daily       amLODIPine (NORVASC) 2.5 MG tablet Take 2.5 mg by mouth daily       aspirin 81 MG EC tablet Take 81 mg by mouth daily       atorvastatin (LIPITOR) 10 MG tablet Take 0.5 tablets (5 mg) by mouth daily 31 tablet 11     diclofenac (VOLTAREN) 1 % topical gel Apply 2 g topically 4 times daily as needed for moderate pain APPLY TO BACK/LEGS       dulaglutide (TRULICITY) 1.5 MG/0.5ML pen Inject 1.5 mg Subcutaneous every 7 days 1.5 mL 11     gabapentin (NEURONTIN) 300 MG capsule Take 300 mg by mouth 2 times daily       gemfibrozil (LOPID) 600 MG tablet TAKE 1 TABLET (600 MG) BY MOUTH 2 TIMES DAILY (BEFORE MEALS) 60 tablet 10     lidocaine (LIDODERM) 5 % patch Place 2 patches onto the skin every 24 hours To  Called pt again. LM on . Stated I would also send message in [employer] Sent message.   "prevent lidocaine toxicity, patient should be patch free for 12 hrs daily. APPLY TO LOWER BACK       losartan (COZAAR) 100 MG tablet Take 100 mg by mouth daily       melatonin 3 MG tablet Take 3 mg by mouth At Bedtime       metFORMIN (GLUCOPHAGE) 1000 MG tablet Take 1,000 mg by mouth 2 times daily (with meals)       omeprazole (PRILOSEC) 20 MG DR capsule Take 20 mg by mouth 2 times daily       ondansetron (ZOFRAN) 4 MG tablet Take 4 mg by mouth every 6 hours as needed for nausea       polyethylene glycol (MIRALAX) 17 g packet Take 17 g by mouth daily as needed for constipation       sennosides (SENOKOT) 8.6 MG tablet Take 2 tablets by mouth daily as needed for constipation       thiamine (B-1) 100 MG tablet Take 100 mg by mouth daily       triamcinolone (KENALOG) 0.1 % external cream Apply topically 2 times daily APPLY TO RIGHT KNEE       venlafaxine (EFFEXOR) 75 MG tablet Take 75 mg by mouth daily       Medications reviewed:  Medications reconciled to facility chart and changes were made to reflect current medications as identified as above med list. Below are the changes that were made:   Medications stopped since last EPIC medication reconciliation:   There are no discontinued medications.  Medications started since last [facility] medication reconciliation:  No orders of the defined types were placed in this encounter.    REVIEW OF SYSTEMS:  Unable to be obtained due to cognitive impairment or aphasia. [facility] 8/15.     PHYSICAL EXAM:  /89   Pulse 78   Temp (!) 96.6  F (35.9  C)   Resp 18   Ht 1.753 m (5' 9\")   Wt 83.1 kg (183 lb 3.2 oz)   SpO2 99%   BMI 27.05 kg/m    Gen: sitting in chair, alert, cooperative and in no acute distress  Resp: breathing non labored, no tachypnea   Ext: no LE edema  Neuro: CX II-XII grossly in tact; ROM in all four extremities grossly in tact  Psych: memory, judgement and insight impaired  Skin: there is a boil type       LABS/IMAGING: Reviewed as per Epic and/or " CareEverywhere    ASSESSMENT / PLAN:    Left Armpit Boil vs Plugged Hair Follicle  Does not look infected or cellulitic.   -- agree with warm heat, it should come to a head  -- nursing to update if any concerns for infection and we can start an antibiotic     HTN  SBPs 110s-140s. HR 60s-80s. Weight down about 15 lbs from a year ago. BMP in August.   -- amlodipine 2.5 mg daily -- add hold for SBP <110  -- losartan 100 mg daily   -- follow BPs and adjust medications as needed    DM, Type II   Hgb A1c 8.7 in August. Sugars 130s-150s  (am) and 140s-180s (hs)  -- metformin 1000 mg BID  -- dulaglutide 1.5 mg subQ weekly  -- follow sugars, A1c    Electronically signed by  Mona Davalos MD              Sincerely,        Mona Davalos MD

## 2024-02-14 ENCOUNTER — NURSING HOME VISIT (OUTPATIENT)
Dept: GERIATRICS | Facility: CLINIC | Age: 79
End: 2024-02-14
Payer: COMMERCIAL

## 2024-02-14 VITALS
DIASTOLIC BLOOD PRESSURE: 80 MMHG | OXYGEN SATURATION: 94 % | HEIGHT: 69 IN | TEMPERATURE: 97.2 F | HEART RATE: 61 BPM | SYSTOLIC BLOOD PRESSURE: 130 MMHG | WEIGHT: 169.9 LBS | BODY MASS INDEX: 25.17 KG/M2 | RESPIRATION RATE: 18 BRPM

## 2024-02-14 DIAGNOSIS — F33.9 RECURRENT MAJOR DEPRESSIVE DISORDER, REMISSION STATUS UNSPECIFIED (H): Primary | ICD-10-CM

## 2024-02-14 DIAGNOSIS — F43.10 PTSD (POST-TRAUMATIC STRESS DISORDER): ICD-10-CM

## 2024-02-14 DIAGNOSIS — F10.20 ALCOHOL USE DISORDER, MODERATE, DEPENDENCE (H): ICD-10-CM

## 2024-02-14 DIAGNOSIS — F04 WERNICKE-KORSAKOFF SYNDROME (H): ICD-10-CM

## 2024-02-14 PROCEDURE — 99309 SBSQ NF CARE MODERATE MDM 30: CPT | Performed by: NURSE PRACTITIONER

## 2024-02-14 NOTE — LETTER
2/14/2024        RE: Román Houser  [facility]  [address]  [facility] [address]        [facility] GERIATRIC SERVICES    No chief complaint on file.    HPI:  Román Houser is a 78 year old  (1945), who is being seen today for an episodic care visit at: No question data found.. Today's concern is:   Data Unavailable    Resident is met with today to follow-up on chronic, currently ***, mental health diagnoses. Patient notes ***. ***. Appetite ***. *** sleeping well. Denies CP, palpitations, fatigue, nausea, vomiting, increased SOB/HUANG, fever, chills, and/or b/b concerns today. Patient requires ***.    Recent behavioral or pertient notes from facility:  ***    Prior visit notes:  ***      Allergies, and PMH/PSH reviewed in [facility] today.    Current psychiatric medications:  ***    Recently changed psychiatric medications:  ***    Other pertinent medications:  ***       Past Medical and Surgical History reviewed in Epic today.    MEDICATIONS:  Current Outpatient Medications   Medication Sig Dispense Refill    acetaminophen (TYLENOL) 500 MG tablet Take 1,000 mg by mouth 2 times daily      amLODIPine (NORVASC) 2.5 MG tablet Take 2.5 mg by mouth daily      aspirin 81 MG EC tablet Take 81 mg by mouth daily      atorvastatin (LIPITOR) 10 MG tablet Take 0.5 tablets (5 mg) by mouth daily 31 tablet 11    diclofenac (VOLTAREN) 1 % topical gel Apply 2 g topically 4 times daily as needed for moderate pain APPLY TO BACK/LEGS      dulaglutide (TRULICITY) 1.5 MG/0.5ML pen Inject 1.5 mg Subcutaneous every 7 days 1.5 mL 11    gabapentin (NEURONTIN) 300 MG capsule Take 300 mg by mouth 2 times daily      gemfibrozil (LOPID) 600 MG tablet TAKE 1 TABLET (600 MG) BY MOUTH 2 TIMES DAILY (BEFORE MEALS) 60 tablet 10    lidocaine (LIDODERM) 5 % patch Place 2 patches onto the skin every 24 hours To prevent lidocaine toxicity, patient should be patch free for 12 hrs daily. APPLY TO LOWER BACK      losartan (COZAAR) 100 MG  tablet Take 100 mg by mouth daily      melatonin 3 MG tablet Take 3 mg by mouth At Bedtime      metFORMIN (GLUCOPHAGE) 1000 MG tablet Take 1,000 mg by mouth 2 times daily (with meals)      omeprazole (PRILOSEC) 20 MG DR capsule Take 20 mg by mouth 2 times daily      ondansetron (ZOFRAN) 4 MG tablet Take 4 mg by mouth every 6 hours as needed for nausea      polyethylene glycol (MIRALAX) 17 g packet Take 17 g by mouth daily as needed for constipation      sennosides (SENOKOT) 8.6 MG tablet Take 2 tablets by mouth daily as needed for constipation      thiamine (B-1) 100 MG tablet Take 100 mg by mouth daily      triamcinolone (KENALOG) 0.1 % external cream Apply topically 2 times daily APPLY TO RIGHT KNEE      venlafaxine (EFFEXOR) 75 MG tablet Take 75 mg by mouth daily           MEDICAL REVIEW OF SYSTEMS:   9 point ROS of systems including Constitutional, Eyes, Respiratory, Cardiovascular, Gastroenterology, Genitourinary, Integumentary, Musculoskeletal were all negative except for pertinent positives noted in my HPI.    PSYCHIATRIC REVIEW OF SYSTEMS:  {PSY ROS:855893}    PHYSICAL EXAM:  There were no vitals taken for this visit.  {long-term physical exam :929823}    PSYCHIATRIC EXAM:  Appearance:  { :448517}  Attitude:  { :921024}  Eye Contact:  { :564341}  Mood:  { :385590}  Affect:  { :245806}  Speech:  { :053148}  Psychomotor Behavior:  { :228973}  Thought Process:  { :484167}  Associations:  { :074943}  Thought Content:  { :817226}  Insight:  { :293776}  Judgment:  { :788262}  Oriented to:  { :416641}  Attention Span and Concentration:  { :171553}  Recent and Remote Memory:  { :976348}  Language: { :361679}  Fund of Knowledge: { :210436}  Muscle Strength and Tone: { :013024}  Gait and Station: { :782796}     Labs:   Recent labs in [facility] reviewed by me today.      ASSESSMENT/PLAN:  No diagnosis found.    Status*** - ***. The resident appears to be *** within noted diagnoses requiring ongoing medication adjustments  and close monitoring. Resident ***. ***.    Medications:  ***    Labs:  ***    Psychological support:  ***    Plan to follow-up with resident in ***, or PRN for acute concerns.    Electronically signed by:  Dr. Racquel Cunningham, LONG, DNP, AGNP-BC, PMHNP-BC  Office Hours: Tues-Fri [id number]  [facility]  Office: [address] [place] [address]  Fax - [phone]  Office/Triage Phone - [phone]  Email: [email or web address]            Sincerely,        LONG Pennington CNP

## 2024-02-14 NOTE — PROGRESS NOTES
[facility] GERIATRIC SERVICES    Chief Complaint   Patient presents with    Psychiatric Problem     HPI:  Román Houser is a 78 year old  (1945), who is being seen today for an episodic care visit at: [facility] [[id number]]. Today's concern is:      Recurrent major depressive disorder, remission status unspecified (H24)  PTSD (post-traumatic stress disorder)  Alcohol use disorder, moderate, dependence (H)  Wernicke-Korsakoff syndrome (H24)    Resident is met with today to follow-up on chronic, currently stable, mental health diagnoses. Patient notes he's doing well. He is seen today on the unit - however, he appears more disheveled than prior baseline. Staff do report he's doing okay following reduction of Effexor (this was recommended by psychology given lack of MH symptoms) however it is noted that he has been having negative interactions with his roommate - relocation is being utilized to defuse this situation. Appetite is at baseline. He has not been sleeping well due to noted interactions. Denies CP, palpitations, fatigue,     Allergies, and PMH/PSH reviewed in [facility] today.    Current psychiatric medications:  Melatonin 5mg PO at bedtime  Venlafaxine 50mg PO qDay    Recently changed psychiatric medications:  2/5/24: Venlafaxine decreased from 75mg    Other pertinent medications:  Thiamine 100mg PO qDay        Past Medical and Surgical History reviewed in Epic today.    MEDICATIONS:  Current Outpatient Medications   Medication Sig Dispense Refill    acetaminophen (TYLENOL) 500 MG tablet Take 1,000 mg by mouth 2 times daily      amLODIPine (NORVASC) 2.5 MG tablet Take 2.5 mg by mouth daily      aspirin 81 MG EC tablet Take 81 mg by mouth daily      atorvastatin (LIPITOR) 10 MG tablet Take 0.5 tablets (5 mg) by mouth daily 31 tablet 11    diclofenac (VOLTAREN) 1 % topical gel Apply 2 g topically 4 times daily as needed for moderate pain APPLY TO BACK/LEGS      dulaglutide (TRULICITY) 1.5 MG/0.5ML  "pen Inject 1.5 mg Subcutaneous every 7 days 1.5 mL 11    gabapentin (NEURONTIN) 300 MG capsule Take 300 mg by mouth 2 times daily      gemfibrozil (LOPID) 600 MG tablet TAKE 1 TABLET (600 MG) BY MOUTH 2 TIMES DAILY (BEFORE MEALS) 60 tablet 10    lidocaine (LIDODERM) 5 % patch Place 2 patches onto the skin every 24 hours To prevent lidocaine toxicity, patient should be patch free for 12 hrs daily. APPLY TO LOWER BACK      losartan (COZAAR) 100 MG tablet Take 100 mg by mouth daily      melatonin 3 MG tablet Take 3 mg by mouth At Bedtime      metFORMIN (GLUCOPHAGE) 1000 MG tablet Take 1,000 mg by mouth 2 times daily (with meals)      omeprazole (PRILOSEC) 20 MG DR capsule Take 20 mg by mouth 2 times daily      ondansetron (ZOFRAN) 4 MG tablet Take 4 mg by mouth every 6 hours as needed for nausea      polyethylene glycol (MIRALAX) 17 g packet Take 17 g by mouth daily as needed for constipation      sennosides (SENOKOT) 8.6 MG tablet Take 2 tablets by mouth daily as needed for constipation      thiamine (B-1) 100 MG tablet Take 100 mg by mouth daily      triamcinolone (KENALOG) 0.1 % external cream Apply topically 2 times daily APPLY TO RIGHT KNEE      venlafaxine (EFFEXOR) 75 MG tablet Take 75 mg by mouth daily           MEDICAL REVIEW OF SYSTEMS:   9 point ROS of systems including Constitutional, Eyes, Respiratory, Cardiovascular, Gastroenterology, Genitourinary, Integumentary, Musculoskeletal were all negative except for pertinent positives noted in my HPI.    PSYCHIATRIC REVIEW OF SYSTEMS:  Anxiety:  worries, prompted by roommate  Depression:  negative  Eating Disorders:  negative  Impulse Control:  negative  Sonali: negative  OCD:  negative  Psychosis:  negative  PTSD:  history of trauma and     PHYSICAL EXAM:  /80   Pulse 61   Temp 97.2  F (36.2  C)   Resp 18   Ht 1.753 m (5' 9\")   Wt 77.1 kg (169 lb 14.4 oz)   SpO2 94%   BMI 25.09 kg/m    GENERAL APPEARANCE:  Alert, in no distress, thin, " somnolent, cooperative  RESP:  no respiratory distress    PSYCHIATRIC EXAM:  Appearance:  awake, alert, appeared as age stated, cooperative, no apparent distress, thin, and slightly unkempt  Attitude:  cooperative  Eye Contact:  good  Mood:  good  Affect:  appropriate and in normal range and intensity is normal  Speech:  clear, coherent  Psychomotor Behavior:  no evidence of tardive dyskinesia, dystonia, or tics  Thought Process:  circumstantial  Associations:  no loose associations  Thought Content:  no evidence of suicidal ideation or homicidal ideation, no evidence of psychotic thought, no auditory hallucinations present, and no visual hallucinations present  Insight:  fair  Judgment:  fair  Oriented to:   self and parts of current situation  Attention Span and Concentration:  fair  Recent and Remote Memory:  limited  Language: Able to name objects and Able to repeat phrases  Fund of Knowledge: appropriate  Muscle Strength and Tone: atrophy  Gait and Station: Normal     Labs:   Recent labs in [facility] reviewed by me today.      ASSESSMENT/PLAN:    ICD-10-CM    1. Recurrent major depressive disorder, remission status unspecified (H24)  F33.9 Chronic - stable. The resident appears to be stable within noted diagnoses requiring ongoing medication adjustments and close monitoring. Resident appears to be tolerating GDR of Effexor at this time. Situational concerns effecting emotions at this time. Today we will continue medications as ordered with close monitoring an follow-up as noted below.   2. PTSD (post-traumatic stress disorder)  F43.10 Medications:  Continue Effexor 50mg PO qDay  Continue Melatonin 5mg PO qHS   3. Alcohol use disorder, moderate, dependence (H)  F10.20 Labs:  Regular monitoring of CMP on current tx regimen   4. Wernicke-Korsakoff syndrome (H24)  F04 Psychological support:  Ongoing emotional and behavioral support from facility/unit staff as per current POC as this remains appropriate  Ongoing  psychotherapy with ACP in-house    Plan to follow-up with resident in 1 month, or PRN for acute concerns.        Appointment performed in person with the provider at [facility]. The time spent was used as follows: 1) Preparing to see the patient. 2) Obtaining and/or reviewing separately obtained history. 3) Performing a medically appropriate examination and/or evaluation. 4) Counseling and educating the patient/family caregiver. 5) Ordering medications and/or tests and/or procedures. 6) Referring and communicating with other health care professionals. 7) Documenting clinical information in electronic health records. 8) Care coordination. The total time of the appointment was 37 minutes.        Electronically signed by:  Dr. Racquel Cunningham, APRN, DNP, AGNP-BC, PMHNP-BC  Office Hours: Tues-Fri [phone]  [employer]  Office: [address] [place] [address]  Fax - [phone]  Office/Triage Phone - [phone]  Email: [email or web address]

## 2024-03-04 ENCOUNTER — NURSING HOME VISIT (OUTPATIENT)
Dept: GERIATRICS | Facility: CLINIC | Age: 79
End: 2024-03-04
Payer: COMMERCIAL

## 2024-03-04 VITALS
SYSTOLIC BLOOD PRESSURE: 112 MMHG | WEIGHT: 170.2 LBS | BODY MASS INDEX: 25.21 KG/M2 | RESPIRATION RATE: 18 BRPM | HEIGHT: 69 IN | TEMPERATURE: 96.7 F | OXYGEN SATURATION: 93 % | DIASTOLIC BLOOD PRESSURE: 82 MMHG | HEART RATE: 68 BPM

## 2024-03-04 DIAGNOSIS — F04 WERNICKE-KORSAKOFF SYNDROME (H): ICD-10-CM

## 2024-03-04 DIAGNOSIS — E11.69 TYPE 2 DIABETES MELLITUS WITH OTHER SPECIFIED COMPLICATION, WITHOUT LONG-TERM CURRENT USE OF INSULIN (H): Primary | ICD-10-CM

## 2024-03-04 DIAGNOSIS — I10 BENIGN ESSENTIAL HYPERTENSION: ICD-10-CM

## 2024-03-04 PROCEDURE — 99309 SBSQ NF CARE MODERATE MDM 30: CPT | Performed by: INTERNAL MEDICINE

## 2024-03-04 RX ORDER — DULAGLUTIDE 3 MG/.5ML
3 INJECTION, SOLUTION SUBCUTANEOUS WEEKLY
COMMUNITY
End: 2024-06-07 | Stop reason: SINTOL

## 2024-03-04 NOTE — LETTER
3/4/2024        RE: Román Houser  [facility]  [address]  [facility] [address]        [facility] GERIATRICS  REGULATORY VISIT  March 4, 2024      [facility] Medical Record Number:  [id number]  Place of Service where encounter took place:  [facility] () [[id number]]    Chief Complaint   Patient presents with     detention Regulatory       HPI:    Román Houser is a 78 year old  (1945), who is being seen today for a federally mandated E/M visit at [facility] where he has resided since May 2022. HPI information obtained from: facility chart records, facility staff and [facility] chart review.     Today, Mr. Houser is seen sitting in a chair after breakfast. BIMS 8/15. Reports no aches or pains. No concerns today per discussion with nursing. Was seen by ACP in mid-Feb.     ALLERGIES:  No Known Allergies     Past Medical, Surgical, Family and Social History: Reviewed and updated in EPIC.    MEDICATIONS:  Current Outpatient Medications   Medication Sig Dispense Refill     acetaminophen (TYLENOL) 500 MG tablet Take 1,000 mg by mouth 2 times daily       amLODIPine (NORVASC) 2.5 MG tablet Take 2.5 mg by mouth daily       aspirin 81 MG EC tablet Take 81 mg by mouth daily       atorvastatin (LIPITOR) 10 MG tablet Take 0.5 tablets (5 mg) by mouth daily 31 tablet 11     diclofenac (VOLTAREN) 1 % topical gel Apply 2 g topically 4 times daily as needed for moderate pain APPLY TO BACK/LEGS       Dulaglutide (TRULICITY) 3 MG/0.5ML SOPN Inject 3 mg Subcutaneous once a week On Wednesdays       gabapentin (NEURONTIN) 300 MG capsule Take 300 mg by mouth 2 times daily       gemfibrozil (LOPID) 600 MG tablet TAKE 1 TABLET (600 MG) BY MOUTH 2 TIMES DAILY (BEFORE MEALS) 60 tablet 10     lidocaine (LIDODERM) 5 % patch Place 2 patches onto the skin every 24 hours To prevent lidocaine toxicity, patient should be patch free for 12 hrs daily. APPLY TO LOWER BACK        "losartan (COZAAR) 100 MG tablet Take 100 mg by mouth daily       melatonin 3 MG tablet Take 3 mg by mouth At Bedtime       metFORMIN (GLUCOPHAGE) 1000 MG tablet Take 1,000 mg by mouth 2 times daily (with meals)       omeprazole (PRILOSEC) 20 MG DR capsule Take 20 mg by mouth 2 times daily       ondansetron (ZOFRAN) 4 MG tablet Take 4 mg by mouth every 6 hours as needed for nausea       polyethylene glycol (MIRALAX) 17 g packet Take 17 g by mouth daily as needed for constipation       sennosides (SENOKOT) 8.6 MG tablet Take 2 tablets by mouth daily as needed for constipation       thiamine (B-1) 100 MG tablet Take 100 mg by mouth daily       triamcinolone (KENALOG) 0.1 % external cream Apply topically 2 times daily APPLY TO RIGHT KNEE       venlafaxine (EFFEXOR) 50 MG tablet Take 50 mg by mouth daily       Medications reviewed:  Medications reconciled to facility chart and changes were made to reflect current medications as identified as above med list. Below are the changes that were made:   Medications stopped since last EPIC medication reconciliation:   Medications Discontinued During This Encounter   Medication Reason     dulaglutide (TRULICITY) 1.5 MG/0.5ML pen Med Rec(No AVS / No eCancel)     Medications started since last [facility] medication reconciliation:  Orders Placed This Encounter   Medications     Dulaglutide (TRULICITY) 3 MG/0.5ML SOPN     Sig: Inject 3 mg Subcutaneous once a week On Wednesdays       REVIEW OF SYSTEMS:  Unable to be obtained due to cognitive impairment or aphasia. [facility] 8/15    PHYSICAL EXAM:  /82   Pulse 68   Temp (!) 96.7  F (35.9  C)   Resp 18   Ht 1.753 m (5' 9\")   Wt 77.2 kg (170 lb 3.2 oz)   SpO2 93%   BMI 25.13 kg/m    Gen: sitting in chair, alert, cooperative and in no acute distress  Resp: breathing non labored, no tachypnea  Ext: no LE edema  Neuro: CX II-XII grossly in tact; ROM in all four extremities grossly in tact  Psych: memory, judgement and insight " impaired    LABS/IMAGING: Reviewed as per Epic and/or [facility]    ASSESSMENT / PLAN:  DM, Type II   Hgb A1c 8.7 in August. Sugars 120s-150s (am) and  120s-180s (hs)  -- metformin 1000 mg BID  -- dulaglutide 3 mg subQ weekly on Wed  -- follow sugars, A1c     HTN   SBPs 110s. HR 60s-80s. Weight down about 20 lbs from a year ago  -- amlodipine 2.5 mg daily, losartan 100 mg daily   -- follow BPs and adjust medications as needed    Gustavo  Major Recurrent Depression  Clinical picture of anterograde amnesia plus imaging findings support the diagnosis. BIMS 8/15.   -- thiamine 100 mg daily   -- venlafaxine 50 mg daily  -- supportive cares   -- appreciate ACP seeing him (most recent visit last month)              Electronically signed by  Mona Davalos MD              Sincerely,        Mona Davalos MD

## 2024-03-04 NOTE — PROGRESS NOTES
[facility] GERIATRICS  REGULATORY VISIT  March 4, 2024      [facility] Medical Record Number:  [id number]  Place of Service where encounter took place:  [facility] () [[id number]]    Chief Complaint   Patient presents with    FPC Regulatory       HPI:    Román Houser is a 78 year old  (1945), who is being seen today for a federally mandated E/M visit at [facility] where he has resided since May 2022. HPI information obtained from: facility chart records, facility staff and [facility] chart review.     Today, Mr. Houser is seen sitting in a chair after breakfast. BIMS 8/15. Reports no aches or pains. No concerns today per discussion with nursing. Was seen by ACP in mid-Feb.     ALLERGIES:  No Known Allergies     Past Medical, Surgical, Family and Social History: Reviewed and updated in EPIC.    MEDICATIONS:  Current Outpatient Medications   Medication Sig Dispense Refill    acetaminophen (TYLENOL) 500 MG tablet Take 1,000 mg by mouth 2 times daily      amLODIPine (NORVASC) 2.5 MG tablet Take 2.5 mg by mouth daily      aspirin 81 MG EC tablet Take 81 mg by mouth daily      atorvastatin (LIPITOR) 10 MG tablet Take 0.5 tablets (5 mg) by mouth daily 31 tablet 11    diclofenac (VOLTAREN) 1 % topical gel Apply 2 g topically 4 times daily as needed for moderate pain APPLY TO BACK/LEGS      Dulaglutide (TRULICITY) 3 MG/0.5ML SOPN Inject 3 mg Subcutaneous once a week On Wednesdays      gabapentin (NEURONTIN) 300 MG capsule Take 300 mg by mouth 2 times daily      gemfibrozil (LOPID) 600 MG tablet TAKE 1 TABLET (600 MG) BY MOUTH 2 TIMES DAILY (BEFORE MEALS) 60 tablet 10    lidocaine (LIDODERM) 5 % patch Place 2 patches onto the skin every 24 hours To prevent lidocaine toxicity, patient should be patch free for 12 hrs daily. APPLY TO LOWER BACK      losartan (COZAAR) 100 MG tablet Take 100 mg by mouth daily      melatonin 3 MG tablet Take 3 mg by mouth At Bedtime       "metFORMIN (GLUCOPHAGE) 1000 MG tablet Take 1,000 mg by mouth 2 times daily (with meals)      omeprazole (PRILOSEC) 20 MG DR capsule Take 20 mg by mouth 2 times daily      ondansetron (ZOFRAN) 4 MG tablet Take 4 mg by mouth every 6 hours as needed for nausea      polyethylene glycol (MIRALAX) 17 g packet Take 17 g by mouth daily as needed for constipation      sennosides (SENOKOT) 8.6 MG tablet Take 2 tablets by mouth daily as needed for constipation      thiamine (B-1) 100 MG tablet Take 100 mg by mouth daily      triamcinolone (KENALOG) 0.1 % external cream Apply topically 2 times daily APPLY TO RIGHT KNEE      venlafaxine (EFFEXOR) 50 MG tablet Take 50 mg by mouth daily       Medications reviewed:  Medications reconciled to facility chart and changes were made to reflect current medications as identified as above med list. Below are the changes that were made:   Medications stopped since last EPIC medication reconciliation:   Medications Discontinued During This Encounter   Medication Reason    dulaglutide (TRULICITY) 1.5 MG/0.5ML pen Med Rec(No AVS / No eCancel)     Medications started since last [facility] medication reconciliation:  Orders Placed This Encounter   Medications    Dulaglutide (TRULICITY) 3 MG/0.5ML SOPN     Sig: Inject 3 mg Subcutaneous once a week On Wednesdays       REVIEW OF SYSTEMS:  Unable to be obtained due to cognitive impairment or aphasia. [facility] 8/15    PHYSICAL EXAM:  /82   Pulse 68   Temp (!) 96.7  F (35.9  C)   Resp 18   Ht 1.753 m (5' 9\")   Wt 77.2 kg (170 lb 3.2 oz)   SpO2 93%   BMI 25.13 kg/m    Gen: sitting in chair, alert, cooperative and in no acute distress  Resp: breathing non labored, no tachypnea  Ext: no LE edema  Neuro: CX II-XII grossly in tact; ROM in all four extremities grossly in tact  Psych: memory, judgement and insight impaired    LABS/IMAGING: Reviewed as per Epic and/or [facility]    ASSESSMENT / PLAN:  DM, Type II   Hgb A1c 8.7 in August. Sugars " 120s-150s (am) and  120s-180s (hs)  -- metformin 1000 mg BID  -- dulaglutide 3 mg subQ weekly on Wed  -- follow sugars, A1c     HTN   SBPs 110s. HR 60s-80s. Weight down about 20 lbs from a year ago  -- amlodipine 2.5 mg daily, losartan 100 mg daily   -- follow BPs and adjust medications as needed    Gustavo  Major Recurrent Depression  Clinical picture of anterograde amnesia plus imaging findings support the diagnosis. [facility] 8/15.   -- thiamine 100 mg daily   -- venlafaxine 50 mg daily  -- supportive cares   -- appreciate ACP seeing him (most recent visit last month)              Electronically signed by  Mona Davalos MD

## 2024-05-03 ENCOUNTER — NURSING HOME VISIT (OUTPATIENT)
Dept: GERIATRICS | Facility: CLINIC | Age: 79
End: 2024-05-03
Payer: COMMERCIAL

## 2024-05-03 VITALS
OXYGEN SATURATION: 99 % | SYSTOLIC BLOOD PRESSURE: 128 MMHG | BODY MASS INDEX: 24.2 KG/M2 | TEMPERATURE: 96.6 F | DIASTOLIC BLOOD PRESSURE: 75 MMHG | WEIGHT: 163.4 LBS | HEIGHT: 69 IN | RESPIRATION RATE: 18 BRPM | HEART RATE: 66 BPM

## 2024-05-03 DIAGNOSIS — E11.65 TYPE 2 DIABETES MELLITUS WITH HYPERGLYCEMIA, WITHOUT LONG-TERM CURRENT USE OF INSULIN (H): Primary | ICD-10-CM

## 2024-05-03 DIAGNOSIS — F10.27: ICD-10-CM

## 2024-05-03 DIAGNOSIS — I10 BENIGN ESSENTIAL HYPERTENSION: ICD-10-CM

## 2024-05-03 PROCEDURE — 99309 SBSQ NF CARE MODERATE MDM 30: CPT | Performed by: NURSE PRACTITIONER

## 2024-05-03 NOTE — PROGRESS NOTES
[facility] GERIATRICS  Chief Complaint   Patient presents with    [facility] Regulatory     [place] Medical Record Number:  [id number]  Place of Service where encounter took place:  [facility] () [[id number]]    HPI:    Román Houser  is 78 year old (1945), who is being seen today for a federally mandated E/M visit.     Today's concerns are:  Type 2 diabetes mellitus with hyperglycemia, without long-term current use of insulin (H)  Benign essential hypertension  Moderate dementia associated with alcoholism, with mood disturbance (H)  Patient is laying in bed, is more subdued than usual. He says he is fine. He denies any physical complaints. Staff had reported that he was eating less, losing weight, sleeping more. PHQ9 = 6/27. His venlafaxine was reduced in February. The psych provider is going to see him this month.       ALLERGIES:Patient has no known allergies.  PAST MEDICAL HISTORY:   Past Medical History:   Diagnosis Date    AAA (abdominal aortic aneurysm) (H24)     Bilateral carpal tunnel syndrome     Cataract     Depression     Epiretinal membrane     ETOH abuse     Hip pain     Psoriasis     PTSD (post-traumatic stress disorder)     Sleep apnea      PAST SURGICAL HISTORY:   has a past surgical history that includes CE IOL LEFT EYE.  FAMILY HISTORY: family history is not on file.  SOCIAL HISTORY:  reports that he has quit smoking. He does not have any smokeless tobacco history on file.    MEDICATIONS:  Current Outpatient Medications   Medication Sig Dispense Refill    acetaminophen (TYLENOL) 500 MG tablet Take 1,000 mg by mouth 2 times daily      amLODIPine (NORVASC) 2.5 MG tablet Take 2.5 mg by mouth daily      aspirin 81 MG EC tablet Take 81 mg by mouth daily      atorvastatin (LIPITOR) 10 MG tablet Take 0.5 tablets (5 mg) by mouth daily 31 tablet 11    diclofenac (VOLTAREN) 1 % topical gel Apply 2 g topically 4 times daily as needed for moderate pain APPLY TO BACK/LEGS       "Dulaglutide (TRULICITY) 3 MG/0.5ML SOPN Inject 3 mg Subcutaneous once a week On Wednesdays      gabapentin (NEURONTIN) 300 MG capsule Take 300 mg by mouth 2 times daily      gemfibrozil (LOPID) 600 MG tablet TAKE 1 TABLET (600 MG) BY MOUTH 2 TIMES DAILY (BEFORE MEALS) 60 tablet 10    lidocaine (LIDODERM) 5 % patch Place 2 patches onto the skin every 24 hours To prevent lidocaine toxicity, patient should be patch free for 12 hrs daily. APPLY TO LOWER BACK      losartan (COZAAR) 100 MG tablet Take 100 mg by mouth daily      melatonin 3 MG tablet Take 3 mg by mouth At Bedtime      metFORMIN (GLUCOPHAGE) 1000 MG tablet Take 1,000 mg by mouth 2 times daily (with meals)      omeprazole (PRILOSEC) 20 MG DR capsule Take 20 mg by mouth 2 times daily      ondansetron (ZOFRAN) 4 MG tablet Take 4 mg by mouth every 6 hours as needed for nausea      polyethylene glycol (MIRALAX) 17 g packet Take 17 g by mouth daily as needed for constipation      sennosides (SENOKOT) 8.6 MG tablet Take 2 tablets by mouth daily as needed for constipation      thiamine (B-1) 100 MG tablet Take 100 mg by mouth daily      triamcinolone (KENALOG) 0.1 % external cream Apply topically 2 times daily APPLY TO RIGHT KNEE      venlafaxine (EFFEXOR) 50 MG tablet Take 50 mg by mouth daily           Case Management:  I have reviewed the care plan and MDS and do agree with the plan. Information reviewed:  Medications, vital signs, orders, and nursing notes.    ROS:  Limited secondary to cognitive impairment but today pt reports 4 point ROS including Respiratory, CV, GI and , other than that noted in the HPI,  is negative    Vitals:  /75   Pulse 66   Temp (!) 96.6  F (35.9  C)   Resp 18   Ht 1.753 m (5' 9\")   Wt 74.1 kg (163 lb 6.4 oz)   SpO2 99%   BMI 24.13 kg/m    Body mass index is 24.13 kg/m .  Exam:  GENERAL APPEARANCE:  Alert, in no distress, thin  ENT:  Mouth and posterior oropharynx normal, moist mucous membranes  EYES:  EOM normal, " conjunctiva and lids normal  RESP:  no respiratory distress  CV:  no edema  PSYCH:  insight and judgement impaired, memory impaired     Lab/Diagnostic data:   Recent labs in [facility] reviewed by me today.     ASSESSMENT/PLAN  (E11.65) Type 2 diabetes mellitus with hyperglycemia, without long-term current use of insulin (H)  (primary encounter diagnosis)  Comment: Generally well controlled, BG 200s are rare  Plan: Continue current POC with no changes at this time and adjustments as needed.    (I10) Benign essential hypertension  Comment: Chronic, controlled  Plan: Continue current POC with no changes at this time and adjustments as needed.    (F10.27) Moderate dementia associated with alcoholism, with mood disturbance (H)  Comment: Mood changes could be worsening depression from reduction in venlafaxine vs progressive dementia. Will have psych evaluate  Plan: Continue current POC with no changes at this time and adjustments as needed.        Electronically signed by:  LONG Becerra CNP

## 2024-05-03 NOTE — LETTER
5/3/2024        RE: Román Houser  [facility]  [address]  [facility] [address]        M [facility] GERIATRICS  Chief Complaint   Patient presents with     [facility] Regulatory     [place] Medical Record Number:  [id number]  Place of Service where encounter took place:  [facility] () [[id number]]    HPI:    Román Houser  is 78 year old (1945), who is being seen today for a federally mandated E/M visit.     Today's concerns are:  Type 2 diabetes mellitus with hyperglycemia, without long-term current use of insulin (H)  Benign essential hypertension  Moderate dementia associated with alcoholism, with mood disturbance (H)  Patient is laying in bed, is more subdued than usual. He says he is fine. He denies any physical complaints. Staff had reported that he was eating less, losing weight, sleeping more. PHQ9 = 6/27. His venlafaxine was reduced in February. The psych provider is going to see him this month.       ALLERGIES:Patient has no known allergies.  PAST MEDICAL HISTORY:   Past Medical History:   Diagnosis Date     AAA (abdominal aortic aneurysm) (H24)      Bilateral carpal tunnel syndrome      Cataract      Depression      Epiretinal membrane      ETOH abuse      Hip pain      Psoriasis      PTSD (post-traumatic stress disorder)      Sleep apnea      PAST SURGICAL HISTORY:   has a past surgical history that includes CE IOL LEFT EYE.  FAMILY HISTORY: family history is not on file.  SOCIAL HISTORY:  reports that he has quit smoking. He does not have any smokeless tobacco history on file.    MEDICATIONS:  Current Outpatient Medications   Medication Sig Dispense Refill     acetaminophen (TYLENOL) 500 MG tablet Take 1,000 mg by mouth 2 times daily       amLODIPine (NORVASC) 2.5 MG tablet Take 2.5 mg by mouth daily       aspirin 81 MG EC tablet Take 81 mg by mouth daily       atorvastatin (LIPITOR) 10 MG tablet Take 0.5 tablets (5 mg) by mouth daily 31 tablet 11      "diclofenac (VOLTAREN) 1 % topical gel Apply 2 g topically 4 times daily as needed for moderate pain APPLY TO BACK/LEGS       Dulaglutide (TRULICITY) 3 MG/0.5ML SOPN Inject 3 mg Subcutaneous once a week On Wednesdays       gabapentin (NEURONTIN) 300 MG capsule Take 300 mg by mouth 2 times daily       gemfibrozil (LOPID) 600 MG tablet TAKE 1 TABLET (600 MG) BY MOUTH 2 TIMES DAILY (BEFORE MEALS) 60 tablet 10     lidocaine (LIDODERM) 5 % patch Place 2 patches onto the skin every 24 hours To prevent lidocaine toxicity, patient should be patch free for 12 hrs daily. APPLY TO LOWER BACK       losartan (COZAAR) 100 MG tablet Take 100 mg by mouth daily       melatonin 3 MG tablet Take 3 mg by mouth At Bedtime       metFORMIN (GLUCOPHAGE) 1000 MG tablet Take 1,000 mg by mouth 2 times daily (with meals)       omeprazole (PRILOSEC) 20 MG DR capsule Take 20 mg by mouth 2 times daily       ondansetron (ZOFRAN) 4 MG tablet Take 4 mg by mouth every 6 hours as needed for nausea       polyethylene glycol (MIRALAX) 17 g packet Take 17 g by mouth daily as needed for constipation       sennosides (SENOKOT) 8.6 MG tablet Take 2 tablets by mouth daily as needed for constipation       thiamine (B-1) 100 MG tablet Take 100 mg by mouth daily       triamcinolone (KENALOG) 0.1 % external cream Apply topically 2 times daily APPLY TO RIGHT KNEE       venlafaxine (EFFEXOR) 50 MG tablet Take 50 mg by mouth daily           Case Management:  I have reviewed the care plan and MDS and do agree with the plan. Information reviewed:  Medications, vital signs, orders, and nursing notes.    ROS:  Limited secondary to cognitive impairment but today pt reports 4 point ROS including Respiratory, CV, GI and , other than that noted in the HPI,  is negative    Vitals:  /75   Pulse 66   Temp (!) 96.6  F (35.9  C)   Resp 18   Ht 1.753 m (5' 9\")   Wt 74.1 kg (163 lb 6.4 oz)   SpO2 99%   BMI 24.13 kg/m    Body mass index is 24.13 " kg/m .  Exam:  GENERAL APPEARANCE:  Alert, in no distress, thin  ENT:  Mouth and posterior oropharynx normal, moist mucous membranes  EYES:  EOM normal, conjunctiva and lids normal  RESP:  no respiratory distress  CV:  no edema  PSYCH:  insight and judgement impaired, memory impaired     Lab/Diagnostic data:   Recent labs in [facility] reviewed by me today.     ASSESSMENT/PLAN  (E11.65) Type 2 diabetes mellitus with hyperglycemia, without long-term current use of insulin (H)  (primary encounter diagnosis)  Comment: Generally well controlled, BG 200s are rare  Plan: Continue current POC with no changes at this time and adjustments as needed.    (I10) Benign essential hypertension  Comment: Chronic, controlled  Plan: Continue current POC with no changes at this time and adjustments as needed.    (F10.27) Moderate dementia associated with alcoholism, with mood disturbance (H)  Comment: Mood changes could be worsening depression from reduction in venlafaxine vs progressive dementia. Will have psych evaluate  Plan: Continue current POC with no changes at this time and adjustments as needed.        Electronically signed by:  LONG Becerra CNP            Sincerely,        LONG Becerra CNP

## 2024-06-07 ENCOUNTER — NURSING HOME VISIT (OUTPATIENT)
Dept: GERIATRICS | Facility: CLINIC | Age: 79
End: 2024-06-07
Payer: COMMERCIAL

## 2024-06-07 VITALS
WEIGHT: 161.2 LBS | BODY MASS INDEX: 23.81 KG/M2 | OXYGEN SATURATION: 99 % | DIASTOLIC BLOOD PRESSURE: 72 MMHG | TEMPERATURE: 96.6 F | SYSTOLIC BLOOD PRESSURE: 122 MMHG | RESPIRATION RATE: 18 BRPM | HEART RATE: 61 BPM

## 2024-06-07 DIAGNOSIS — E11.649 TYPE 2 DIABETES MELLITUS WITH HYPOGLYCEMIA WITHOUT COMA, WITHOUT LONG-TERM CURRENT USE OF INSULIN (H): ICD-10-CM

## 2024-06-07 DIAGNOSIS — R63.4 WEIGHT LOSS: Primary | ICD-10-CM

## 2024-06-07 DIAGNOSIS — F32.A DEPRESSION, UNSPECIFIED DEPRESSION TYPE: ICD-10-CM

## 2024-06-07 PROCEDURE — 99309 SBSQ NF CARE MODERATE MDM 30: CPT | Performed by: NURSE PRACTITIONER

## 2024-06-07 NOTE — LETTER
6/7/2024      Román Houser  [facility]  [address]  [facility] [address]        M [facility] GERIATRICS    Chief Complaint   Patient presents with     RECHECK     HPI:  Román Houser is a 78 year old  (1945), who is being seen today for an episodic care visit at: [facility] () [[id number]].     Today's concern is:   Staff reported a 20 lbs weight loss over the past 6 months. His venlafaxine had been decreased in February. In May he was noted to have a lower mood, appeared more disheveled, stayed in his room more, interacted less with others. Order was given on 5/9/24 to increase it back to 75mg. His weight loss seems to have possibly stabilized. Staff feel that he is back to his usual self, is eating better. Also of note, his Trulicity was increased in January due to elevated A1c. Recently he has had a few BG 90s, only 3 readings >200 in the past month.  The patient is seen laying in bed. He says he is doing fine. He is very pleasant, smiling. He has no concerns, thanks provider for coming by. Provider asks if he is ok having blood drawn because he is due for routine labs. He asks if something is wrong with him. Provider reassures him that this is just routine monitoring due to medications he is taking.      Allergies, and PMH/PSH reviewed in [facility] today.  REVIEW OF SYSTEMS:  4 point ROS including Respiratory, CV, GI and , other than that noted in the HPI,  is negative    Objective:   /72   Pulse 61   Temp (!) 96.6  F (35.9  C)   Resp 18   Wt 73.1 kg (161 lb 3.2 oz)   SpO2 99%   BMI 23.81 kg/m    GENERAL APPEARANCE:  Alert, in no distress  ENT:  Mouth and posterior oropharynx normal, moist mucous membranes  EYES:  EOM normal, conjunctiva and lids normal  RESP:  no respiratory distress  CV:  no edema  PSYCH:  insight and judgement impaired, memory impaired , affect and mood normal    Recent labs in [facility] reviewed by me today.     Assessment/Plan:  (R63.4) Weight  loss  (primary encounter diagnosis)  Comment: Weight loss had started before venlafaxine was decreased, but has now stabilized with the increase. His Trulicity could most certainly contribute to weight loss and reduced intake as well. See below    (E11.649) Type 2 diabetes mellitus with hypoglycemia without coma, without long-term current use of insulin (H)  Comment: Due to weight loss and tighter control than necessary, will discontinue Trulicity. If starts gaining significant weight or BG are more predominantly 200s, will resume Trulicity, but leave at a low dose.   Plan: Discontinue Trulicity. Monitor BG. Adjust medication as clinically indicated.    (F32.A) Depression, unspecified depression type  Comment: Improved with increase in venlafaxine. He may also have been suffering from some seasonal affective disorder. Will need to monitor next winter to see if this recurs  Plan: Continue current POC with no changes at this time and adjustments as needed.      Orders:  Discontinue Trulicity  BMP, CBC, A1c, lipid panel 6/10/24    Electronically signed by: LONG Becerra CNP           Sincerely,        LONG Becerra CNP

## 2024-06-07 NOTE — PROGRESS NOTES
[facility] GERIATRICS    Chief Complaint   Patient presents with    RECHECK     HPI:  Román Houser is a 78 year old  (1945), who is being seen today for an episodic care visit at: [facility] () [[id number]].     Today's concern is:   Staff reported a 20 lbs weight loss over the past 6 months. His venlafaxine had been decreased in February. In May he was noted to have a lower mood, appeared more disheveled, stayed in his room more, interacted less with others. Order was given on 5/9/24 to increase it back to 75mg. His weight loss seems to have possibly stabilized. Staff feel that he is back to his usual self, is eating better. Also of note, his Trulicity was increased in January due to elevated A1c. Recently he has had a few BG 90s, only 3 readings >200 in the past month.  The patient is seen laying in bed. He says he is doing fine. He is very pleasant, smiling. He has no concerns, thanks provider for coming by. Provider asks if he is ok having blood drawn because he is due for routine labs. He asks if something is wrong with him. Provider reassures him that this is just routine monitoring due to medications he is taking.      Allergies, and PMH/PSH reviewed in [facility] today.  REVIEW OF SYSTEMS:  4 point ROS including Respiratory, CV, GI and , other than that noted in the HPI,  is negative    Objective:   /72   Pulse 61   Temp (!) 96.6  F (35.9  C)   Resp 18   Wt 73.1 kg (161 lb 3.2 oz)   SpO2 99%   BMI 23.81 kg/m    GENERAL APPEARANCE:  Alert, in no distress  ENT:  Mouth and posterior oropharynx normal, moist mucous membranes  EYES:  EOM normal, conjunctiva and lids normal  RESP:  no respiratory distress  CV:  no edema  PSYCH:  insight and judgement impaired, memory impaired , affect and mood normal    Recent labs in [facility] reviewed by me today.     Assessment/Plan:  (R63.4) Weight loss  (primary encounter diagnosis)  Comment: Weight loss had started before venlafaxine was decreased,  Spoke with pt. Pt stated pt will call daughter and will schedule apt next week.    but has now stabilized with the increase. His Trulicity could most certainly contribute to weight loss and reduced intake as well. See below    (E11.649) Type 2 diabetes mellitus with hypoglycemia without coma, without long-term current use of insulin (H)  Comment: Due to weight loss and tighter control than necessary, will discontinue Trulicity. If starts gaining significant weight or BG are more predominantly 200s, will resume Trulicity, but leave at a low dose.   Plan: Discontinue Trulicity. Monitor BG. Adjust medication as clinically indicated.    (F32.A) Depression, unspecified depression type  Comment: Improved with increase in venlafaxine. He may also have been suffering from some seasonal affective disorder. Will need to monitor next winter to see if this recurs  Plan: Continue current POC with no changes at this time and adjustments as needed.      Orders:  Discontinue Trulicity  BMP, CBC, A1c, lipid panel 6/10/24    Electronically signed by: LONG Becerra CNP

## 2024-06-08 ENCOUNTER — LAB REQUISITION (OUTPATIENT)
Dept: LAB | Facility: CLINIC | Age: 79
End: 2024-06-08
Payer: COMMERCIAL

## 2024-06-08 DIAGNOSIS — E78.5 HYPERLIPIDEMIA, UNSPECIFIED: ICD-10-CM

## 2024-06-08 DIAGNOSIS — E11.40 TYPE 2 DIABETES MELLITUS WITH DIABETIC NEUROPATHY, UNSPECIFIED (H): ICD-10-CM

## 2024-06-10 LAB
ANION GAP SERPL CALCULATED.3IONS-SCNC: 17 MMOL/L (ref 7–15)
BUN SERPL-MCNC: 41.2 MG/DL (ref 8–23)
CALCIUM SERPL-MCNC: 10.5 MG/DL (ref 8.8–10.2)
CHLORIDE SERPL-SCNC: 106 MMOL/L (ref 98–107)
CHOLEST SERPL-MCNC: 168 MG/DL
CREAT SERPL-MCNC: 1.21 MG/DL (ref 0.67–1.17)
DEPRECATED HCO3 PLAS-SCNC: 18 MMOL/L (ref 22–29)
EGFRCR SERPLBLD CKD-EPI 2021: 61 ML/MIN/1.73M2
ERYTHROCYTE [DISTWIDTH] IN BLOOD BY AUTOMATED COUNT: 14.6 % (ref 10–15)
FASTING STATUS PATIENT QL REPORTED: ABNORMAL
GLUCOSE SERPL-MCNC: 122 MG/DL (ref 70–99)
HBA1C MFR BLD: 6.8 %
HCT VFR BLD AUTO: 41.1 % (ref 40–53)
HDLC SERPL-MCNC: 30 MG/DL
HGB BLD-MCNC: 13.1 G/DL (ref 13.3–17.7)
LDLC SERPL CALC-MCNC: 89 MG/DL
MCH RBC QN AUTO: 32.3 PG (ref 26.5–33)
MCHC RBC AUTO-ENTMCNC: 31.9 G/DL (ref 31.5–36.5)
MCV RBC AUTO: 101 FL (ref 78–100)
NONHDLC SERPL-MCNC: 138 MG/DL
PLATELET # BLD AUTO: 328 10E3/UL (ref 150–450)
POTASSIUM SERPL-SCNC: 5.1 MMOL/L (ref 3.4–5.3)
RBC # BLD AUTO: 4.06 10E6/UL (ref 4.4–5.9)
SODIUM SERPL-SCNC: 141 MMOL/L (ref 135–145)
TRIGL SERPL-MCNC: 245 MG/DL
WBC # BLD AUTO: 5.8 10E3/UL (ref 4–11)

## 2024-06-10 PROCEDURE — 36415 COLL VENOUS BLD VENIPUNCTURE: CPT | Performed by: INTERNAL MEDICINE

## 2024-06-10 PROCEDURE — P9604 ONE-WAY ALLOW PRORATED TRIP: HCPCS | Performed by: INTERNAL MEDICINE

## 2024-06-10 PROCEDURE — 85027 COMPLETE CBC AUTOMATED: CPT | Performed by: INTERNAL MEDICINE

## 2024-06-10 PROCEDURE — 80048 BASIC METABOLIC PNL TOTAL CA: CPT | Performed by: INTERNAL MEDICINE

## 2024-06-10 PROCEDURE — 80061 LIPID PANEL: CPT | Performed by: INTERNAL MEDICINE

## 2024-06-10 PROCEDURE — 83036 HEMOGLOBIN GLYCOSYLATED A1C: CPT | Performed by: INTERNAL MEDICINE

## 2024-06-26 ENCOUNTER — NURSING HOME VISIT (OUTPATIENT)
Dept: GERIATRICS | Facility: CLINIC | Age: 79
End: 2024-06-26
Payer: COMMERCIAL

## 2024-06-26 VITALS
BODY MASS INDEX: 24.26 KG/M2 | DIASTOLIC BLOOD PRESSURE: 69 MMHG | RESPIRATION RATE: 18 BRPM | TEMPERATURE: 97.8 F | SYSTOLIC BLOOD PRESSURE: 107 MMHG | WEIGHT: 163.8 LBS | HEIGHT: 69 IN | OXYGEN SATURATION: 97 % | HEART RATE: 66 BPM

## 2024-06-26 DIAGNOSIS — E11.69 TYPE 2 DIABETES MELLITUS WITH OTHER SPECIFIED COMPLICATION, WITHOUT LONG-TERM CURRENT USE OF INSULIN (H): ICD-10-CM

## 2024-06-26 DIAGNOSIS — R62.7 ADULT FAILURE TO THRIVE: Primary | ICD-10-CM

## 2024-06-26 PROCEDURE — 99309 SBSQ NF CARE MODERATE MDM 30: CPT | Performed by: NURSE PRACTITIONER

## 2024-06-26 NOTE — PROGRESS NOTES
"[facility] GERIATRICS    Chief Complaint   Patient presents with    Nursing Home Acute     HPI:  Román Houser is a 79 year old  (1945), who [facility] [[id number]     Today's concern is:   Trulicity was stopped and venlafaxine increased due to weight loss, poor intake, spending more time in bed. Staff report that he is eating well now. Blood sugars 100-180s. Patient reports that he feels good. He does not have any complaints.  Wt Readings from Last 4 Encounters:   06/26/24 74.3 kg (163 lb 12.8 oz)   06/07/24 73.1 kg (161 lb 3.2 oz)   05/03/24 74.1 kg (163 lb 6.4 oz)   03/04/24 77.2 kg (170 lb 3.2 oz)       Allergies, and PMH/PSH reviewed in [facility] today.  REVIEW OF SYSTEMS:  4 point ROS including Respiratory, CV, GI and , other than that noted in the HPI,  is negative    Objective:   /69   Pulse 66   Temp 97.8  F (36.6  C)   Resp 18   Ht 1.753 m (5' 9\")   Wt 74.3 kg (163 lb 12.8 oz)   SpO2 97%   BMI 24.19 kg/m    GENERAL APPEARANCE:  Alert, in no distress  RESP:  no respiratory distress  PSYCH:  insight and judgement impaired, memory impaired , affect and mood normal    Recent labs in [facility] reviewed by me today.     Assessment/Plan:  (R62.7) Adult failure to thrive  (primary encounter diagnosis)  Comment: Patient appears to be thriving now. His weight is stable. The Trulicity was the most likely cause for his decline. The medication was new last fall and increased in January. His weight loss started around December/January.   Plan: Continue current POC with no changes at this time and adjustments as needed.    (E11.69) Type 2 diabetes mellitus with other specified complication, without long-term current use of insulin (H)  Comment: Currently well controlled without Trulicity. Recommend avoiding GLP-1 in the future. If blood sugars become uncontrolled, would add another oral agent. HgA1c <8% not recommended in elderly due to " risk of hypoglycemia.  Risk outweighs benefit of tighter control.   Plan: Continue current POC with no changes at this time and adjustments as needed.        Electronically signed by: LONG Becerra CNP

## 2024-06-26 NOTE — LETTER
" 6/26/2024      Román Houser  [facility]  [address]  [place] [facility] [address]        M [facility] GERIATRICS    Chief Complaint   Patient presents with     Nursing Home Acute     HPI:  Román Houser is a 79 year old  (1945), who is being seen today for an episodic care visit at: [facility] () [[id number]].     Today's concern is:   Trulicity was stopped and venlafaxine increased due to weight loss, poor intake, spending more time in bed. Staff report that he is eating well now. Blood sugars 100-180s. Patient reports that he feels good. He does not have any complaints.  Wt Readings from Last 4 Encounters:   06/26/24 74.3 kg (163 lb 12.8 oz)   06/07/24 73.1 kg (161 lb 3.2 oz)   05/03/24 74.1 kg (163 lb 6.4 oz)   03/04/24 77.2 kg (170 lb 3.2 oz)       Allergies, and PMH/PSH reviewed in [facility] today.  REVIEW OF SYSTEMS:  4 point ROS including Respiratory, CV, GI and , other than that noted in the HPI,  is negative    Objective:   /69   Pulse 66   Temp 97.8  F (36.6  C)   Resp 18   Ht 1.753 m (5' 9\")   Wt 74.3 kg (163 lb 12.8 oz)   SpO2 97%   BMI 24.19 kg/m    GENERAL APPEARANCE:  Alert, in no distress  RESP:  no respiratory distress  PSYCH:  insight and judgement impaired, memory impaired , affect and mood normal    Recent labs in [facility] reviewed by me today.     Assessment/Plan:  (R62.7) Adult failure to thrive  (primary encounter diagnosis)  Comment: Patient appears to be thriving now. His weight is stable. The Trulicity was the most likely cause for his decline. The medication was new last fall and increased in January. His weight loss started around December/January.   Plan: Continue current POC with no changes at this time and adjustments as needed.    (E11.69) Type 2 diabetes mellitus with other specified complication, without long-term current use of insulin (H)  Comment: Currently well controlled without Trulicity. Recommend avoiding GLP-1 in the future. If blood " sugars become uncontrolled, would add another oral agent. HgA1c <8% not recommended in elderly due to risk of hypoglycemia.  Risk outweighs benefit of tighter control.   Plan: Continue current POC with no changes at this time and adjustments as needed.        Electronically signed by: LONG Becerra CNP           Sincerely,        LONG Becerra CNP

## 2024-07-01 ENCOUNTER — NURSING HOME VISIT (OUTPATIENT)
Dept: GERIATRICS | Facility: CLINIC | Age: 79
End: 2024-07-01
Payer: COMMERCIAL

## 2024-07-01 VITALS
SYSTOLIC BLOOD PRESSURE: 118 MMHG | OXYGEN SATURATION: 97 % | HEIGHT: 69 IN | WEIGHT: 163.8 LBS | HEART RATE: 52 BPM | DIASTOLIC BLOOD PRESSURE: 60 MMHG | RESPIRATION RATE: 18 BRPM | TEMPERATURE: 97.8 F | BODY MASS INDEX: 24.26 KG/M2

## 2024-07-01 DIAGNOSIS — I12.9 BENIGN HYPERTENSIVE KIDNEY DISEASE WITH CHRONIC KIDNEY DISEASE STAGE I THROUGH STAGE IV, OR UNSPECIFIED: ICD-10-CM

## 2024-07-01 DIAGNOSIS — F04 WERNICKE-KORSAKOFF SYNDROME (H): ICD-10-CM

## 2024-07-01 DIAGNOSIS — N18.31 STAGE 3A CHRONIC KIDNEY DISEASE (H): ICD-10-CM

## 2024-07-01 DIAGNOSIS — F03.90 DEMENTIA WITHOUT BEHAVIORAL DISTURBANCE, PSYCHOTIC DISTURBANCE, MOOD DISTURBANCE, OR ANXIETY, UNSPECIFIED DEMENTIA SEVERITY, UNSPECIFIED DEMENTIA TYPE (H): ICD-10-CM

## 2024-07-01 DIAGNOSIS — E11.69 TYPE 2 DIABETES MELLITUS WITH OTHER SPECIFIED COMPLICATION, WITHOUT LONG-TERM CURRENT USE OF INSULIN (H): Primary | ICD-10-CM

## 2024-07-01 DIAGNOSIS — F33.9 RECURRENT MAJOR DEPRESSIVE DISORDER, REMISSION STATUS UNSPECIFIED (H): ICD-10-CM

## 2024-07-01 PROCEDURE — 99309 SBSQ NF CARE MODERATE MDM 30: CPT | Performed by: INTERNAL MEDICINE

## 2024-07-01 NOTE — LETTER
7/1/2024      Román Houser  [facility]  [address]  [facility] [address]        [facility] GERIATRICS  REGULATORY VISIT  July 1, 2024    [facility] Medical Record Number:  [id number]  Place of Service where encounter took place:  [facility] () [[id number]]    Chief Complaint   Patient presents with     FDC Regulatory       HPI:    Román Houser is a 79 year old  (1945), who is being seen today for a federally mandated E/M visit  at [facility] where he has resided since May 2022. HPI information obtained from: facility chart records, facility staff and [facility] chart review.     Today, Mr. Houser is seen sitting in a chair in the common room at the end of the unit. He is a limited historian ([facility] 3/15). Tells me today he feels good, no aches or pains. No trouble breathing. Tried to engage him a bit about where he grew up - he said [place] - didn't offer much else. Talked with nursing, no acute concerns today.     ALLERGIES:  No Known Allergies     Past Medical, Surgical, Family and Social History: Reviewed and updated in EPIC.    MEDICATIONS:  Current Outpatient Medications   Medication Sig Dispense Refill     amLODIPine (NORVASC) 2.5 MG tablet Take 2.5 mg by mouth daily       aspirin 81 MG EC tablet Take 81 mg by mouth daily       atorvastatin (LIPITOR) 10 MG tablet Take 0.5 tablets (5 mg) by mouth daily 31 tablet 11     diclofenac (VOLTAREN) 1 % topical gel Apply 2 g topically 4 times daily as needed for moderate pain APPLY TO BACK/LEGS       gabapentin (NEURONTIN) 300 MG capsule Take 300 mg by mouth 2 times daily       gemfibrozil (LOPID) 600 MG tablet TAKE 1 TABLET (600 MG) BY MOUTH 2 TIMES DAILY (BEFORE MEALS) 60 tablet 10     lidocaine (LIDODERM) 5 % patch Place 2 patches onto the skin every 24 hours To prevent lidocaine toxicity, patient should be patch free for 12 hrs daily. APPLY TO LOWER BACK       losartan (COZAAR)  "100 MG tablet Take 100 mg by mouth daily       melatonin 3 MG tablet Take 3 mg by mouth At Bedtime       metFORMIN (GLUCOPHAGE) 1000 MG tablet Take 1,000 mg by mouth 2 times daily (with meals)       omeprazole (PRILOSEC) 20 MG DR capsule Take 20 mg by mouth 2 times daily       ondansetron (ZOFRAN) 4 MG tablet Take 4 mg by mouth every 6 hours as needed for nausea       polyethylene glycol (MIRALAX) 17 g packet Take 17 g by mouth daily as needed for constipation       sennosides (SENOKOT) 8.6 MG tablet Take 2 tablets by mouth daily as needed for constipation       thiamine (B-1) 100 MG tablet Take 100 mg by mouth daily       triamcinolone (KENALOG) 0.1 % external cream Apply topically 2 times daily APPLY TO RIGHT KNEE       venlafaxine (EFFEXOR) 50 MG tablet Take 75 mg by mouth daily       acetaminophen (TYLENOL) 500 MG tablet Take 1,000 mg by mouth 2 times daily       Medications reviewed:  Medications reconciled to facility chart and changes were made to reflect current medications as identified as above med list. Below are the changes that were made:   Medications stopped since last EPIC medication reconciliation:   There are no discontinued medications.  Medications started since last [facility] medication reconciliation:  No orders of the defined types were placed in this encounter.      REVIEW OF SYSTEMS:  Unable to be obtained due to cognitive impairment or aphasia. [facility] 3/15.     PHYSICAL EXAM:  /60   Pulse 52   Temp 97.8  F (36.6  C)   Resp 18   Ht 1.753 m (5' 9\")   Wt 74.3 kg (163 lb 12.8 oz)   SpO2 97%   BMI 24.19 kg/m    Gen: sitting in chair, alert, cooperative and in no acute distress  Resp: breathing non labored, no tachypnea  Ext: no LE edema  Neuro: CX II-XII grossly in tact; ROM in all four extremities grossly in tact  Psych: memory, judgement and insight impaired; [facility] 3/15      LABS/IMAGING: Reviewed as per Epic and/or [facility]    ASSESSMENT / PLAN:    HTN   SBPs 110s-120s. HR 50s-60s " Weight down about 25 lbs from a year ago. BMP last month within baseline  -- amlodipine 2.5 mg daily, losartan 100 mg daily   -- follow BPs and adjust medications as needed    CKD, Stage III  Baseline Cr 1.1-1.25. Cr 1.21 last month. Is on ARB.   -- avoid nephrotoxic meds  -- periodic BMP    DM, Type II   Hgb A1c 6.8 in June. Sugars 130s-170s (am) and 140s-200 (hs). No longer on dulaglutide - A1 is below goal for age and co-morbids.   -- metformin 1000 mg BID  -- follow sugars, A1c per goals of care     Dementia Without Behavioral Disturbance  Major Recurrent Depression  Wernike-Korsakoff Syndrome  Clinical picture of anterograde amnesia plus imaging findings supported the diagnosis of W-K. [facility] 3/15. Weight is down 25 lbs from a year ago. Ambulates with a cane.   -- thiamine 100 mg daily - I think we could stop this, given no longer drinking EtOH  -- venlafaxine 75 mg daily  -- supportive cares   -- appreciate ACP seeing him (most recent visit last month)      Electronically signed by  Mona Davalos MD              Sincerely,        Mona Davalos MD

## 2024-07-01 NOTE — PROGRESS NOTES
[facility] GERIATRICS  REGULATORY VISIT  July 1, 2024    [facility] Medical Record Number:  [id number]  Place of Service where encounter took place:  [facility] () [[id number]]    Chief Complaint   Patient presents with    alf Regulatory       HPI:    Román Houser is a 79 year old  (1945), who is being seen today for a federally mandated E/M visit  at [facility] where he has resided since May 2022. HPI information obtained from: facility chart records, facility staff and [facility] chart review.     Today, Mr. Houser is seen sitting in a chair in the common room at the end of the unit. He is a limited historian ([facility] 3/15). Tells me today he feels good, no aches or pains. No trouble breathing. Tried to engage him a bit about where he grew up - he said [place] - didn't offer much else. Talked with nursing, no acute concerns today.     ALLERGIES:  No Known Allergies     Past Medical, Surgical, Family and Social History: Reviewed and updated in EPIC.    MEDICATIONS:  Current Outpatient Medications   Medication Sig Dispense Refill    amLODIPine (NORVASC) 2.5 MG tablet Take 2.5 mg by mouth daily      aspirin 81 MG EC tablet Take 81 mg by mouth daily      atorvastatin (LIPITOR) 10 MG tablet Take 0.5 tablets (5 mg) by mouth daily 31 tablet 11    diclofenac (VOLTAREN) 1 % topical gel Apply 2 g topically 4 times daily as needed for moderate pain APPLY TO BACK/LEGS      gabapentin (NEURONTIN) 300 MG capsule Take 300 mg by mouth 2 times daily      gemfibrozil (LOPID) 600 MG tablet TAKE 1 TABLET (600 MG) BY MOUTH 2 TIMES DAILY (BEFORE MEALS) 60 tablet 10    lidocaine (LIDODERM) 5 % patch Place 2 patches onto the skin every 24 hours To prevent lidocaine toxicity, patient should be patch free for 12 hrs daily. APPLY TO LOWER BACK      losartan (COZAAR) 100 MG tablet Take 100 mg by mouth daily      melatonin 3 MG tablet Take 3 mg by mouth At Bedtime      metFORMIN  "(GLUCOPHAGE) 1000 MG tablet Take 1,000 mg by mouth 2 times daily (with meals)      omeprazole (PRILOSEC) 20 MG DR capsule Take 20 mg by mouth 2 times daily      ondansetron (ZOFRAN) 4 MG tablet Take 4 mg by mouth every 6 hours as needed for nausea      polyethylene glycol (MIRALAX) 17 g packet Take 17 g by mouth daily as needed for constipation      sennosides (SENOKOT) 8.6 MG tablet Take 2 tablets by mouth daily as needed for constipation      thiamine (B-1) 100 MG tablet Take 100 mg by mouth daily      triamcinolone (KENALOG) 0.1 % external cream Apply topically 2 times daily APPLY TO RIGHT KNEE      venlafaxine (EFFEXOR) 50 MG tablet Take 75 mg by mouth daily      acetaminophen (TYLENOL) 500 MG tablet Take 1,000 mg by mouth 2 times daily       Medications reviewed:  Medications reconciled to facility chart and changes were made to reflect current medications as identified as above med list. Below are the changes that were made:   Medications stopped since last EPIC medication reconciliation:   There are no discontinued medications.  Medications started since last [facility] medication reconciliation:  No orders of the defined types were placed in this encounter.      REVIEW OF SYSTEMS:  Unable to be obtained due to cognitive impairment or aphasia. [facility] 3/15.     PHYSICAL EXAM:  /60   Pulse 52   Temp 97.8  F (36.6  C)   Resp 18   Ht 1.753 m (5' 9\")   Wt 74.3 kg (163 lb 12.8 oz)   SpO2 97%   BMI 24.19 kg/m    Gen: sitting in chair, alert, cooperative and in no acute distress  Resp: breathing non labored, no tachypnea  Ext: no LE edema  Neuro: CX II-XII grossly in tact; ROM in all four extremities grossly in tact  Psych: memory, judgement and insight impaired; [facility] 3/15      LABS/IMAGING: Reviewed as per Epic and/or [facility]    ASSESSMENT / PLAN:    HTN   SBPs 110s-120s. HR 50s-60s Weight down about 25 lbs from a year ago. BMP last month within baseline  -- amlodipine 2.5 mg daily, losartan 100 mg daily "   -- follow BPs and adjust medications as needed    CKD, Stage III  Baseline Cr 1.1-1.25. Cr 1.21 last month. Is on ARB.   -- avoid nephrotoxic meds  -- periodic BMP    DM, Type II   Hgb A1c 6.8 in June. Sugars 130s-170s (am) and 140s-200 (hs). No longer on dulaglutide - A1 is below goal for age and co-morbids.   -- metformin 1000 mg BID  -- follow sugars, A1c per goals of care     Dementia Without Behavioral Disturbance  Major Recurrent Depression  Wernike-Korsakoff Syndrome  Clinical picture of anterograde amnesia plus imaging findings supported the diagnosis of W-K. [facility] 3/15. Weight is down 25 lbs from a year ago. Ambulates with a cane.   -- thiamine 100 mg daily - I think we could stop this, given no longer drinking EtOH  -- venlafaxine 75 mg daily  -- supportive cares   -- appreciate ACP seeing him (most recent visit last month)      Electronically signed by  Mona Davalos MD

## 2024-09-06 ENCOUNTER — NURSING HOME VISIT (OUTPATIENT)
Dept: GERIATRICS | Facility: CLINIC | Age: 79
End: 2024-09-06
Payer: COMMERCIAL

## 2024-09-06 ENCOUNTER — LAB REQUISITION (OUTPATIENT)
Dept: LAB | Facility: CLINIC | Age: 79
End: 2024-09-06
Payer: COMMERCIAL

## 2024-09-06 VITALS
SYSTOLIC BLOOD PRESSURE: 122 MMHG | OXYGEN SATURATION: 96 % | HEART RATE: 53 BPM | WEIGHT: 166.2 LBS | RESPIRATION RATE: 18 BRPM | DIASTOLIC BLOOD PRESSURE: 82 MMHG | TEMPERATURE: 97.4 F | HEIGHT: 69 IN | BODY MASS INDEX: 24.62 KG/M2

## 2024-09-06 DIAGNOSIS — E78.2 MIXED HYPERLIPIDEMIA: ICD-10-CM

## 2024-09-06 DIAGNOSIS — I10 BENIGN ESSENTIAL HYPERTENSION: ICD-10-CM

## 2024-09-06 DIAGNOSIS — F10.96 WERNICKE-KORSAKOFF SYNDROME (ALCOHOLIC) (H): ICD-10-CM

## 2024-09-06 DIAGNOSIS — E11.65 TYPE 2 DIABETES MELLITUS WITH HYPERGLYCEMIA, WITHOUT LONG-TERM CURRENT USE OF INSULIN (H): Primary | ICD-10-CM

## 2024-09-06 DIAGNOSIS — E11.40 TYPE 2 DIABETES MELLITUS WITH DIABETIC NEUROPATHY, UNSPECIFIED (H): ICD-10-CM

## 2024-09-06 PROCEDURE — 99309 SBSQ NF CARE MODERATE MDM 30: CPT | Performed by: NURSE PRACTITIONER

## 2024-09-06 NOTE — LETTER
9/6/2024      Román Houser  [facility]  [address]  [facility] [address]        [facility] GERIATRICS  Chief Complaint   Patient presents with     long-term Regulatory     [place] Medical Record Number:  [id number]  Place of Service where encounter took place:  [facility] () [[id number]]    HPI:    Román Houser  is 79 year old (1945), who is being seen today for a federally mandated E/M visit.     Today's concerns are:  Type 2 diabetes mellitus with hyperglycemia, without long-term current use of insulin (H)  Trulicity was stopped in June due to weight loss and poor appetite. Currently about 50% of his blood sugars are 200s.   Wt Readings from Last 4 Encounters:   09/06/24 75.4 kg (166 lb 3.2 oz)   07/01/24 74.3 kg (163 lb 12.8 oz)   06/26/24 74.3 kg (163 lb 12.8 oz)   06/07/24 73.1 kg (161 lb 3.2 oz)     Wernicke-Korsakoff syndrome (alcoholic) (H)  Patient's memory is very poor. No behavioral concerns per staff. [facility] 5/15. PHQ9 = 0    Benign essential hypertension  -140s/60-80s    Mixed hyperlipidemia  See lipid panel       ALLERGIES:Patient has no known allergies.  PAST MEDICAL HISTORY:   Past Medical History:   Diagnosis Date     AAA (abdominal aortic aneurysm) (H24)      Bilateral carpal tunnel syndrome      Cataract      Depression      Epiretinal membrane      ETOH abuse      Hip pain      Psoriasis      PTSD (post-traumatic stress disorder)      Sleep apnea      PAST SURGICAL HISTORY:   has a past surgical history that includes CE IOL LEFT EYE.  FAMILY HISTORY: family history is not on file.  SOCIAL HISTORY:  reports that he has quit smoking. He does not have any smokeless tobacco history on file.    MEDICATIONS:  Current Outpatient Medications   Medication Sig Dispense Refill     acetaminophen (TYLENOL) 500 MG tablet Take 1,000 mg by mouth 2 times daily       amLODIPine (NORVASC) 2.5 MG tablet Take 2.5 mg by mouth daily       aspirin 81 MG EC tablet Take  "81 mg by mouth daily       atorvastatin (LIPITOR) 10 MG tablet Take 0.5 tablets (5 mg) by mouth daily 31 tablet 11     diclofenac (VOLTAREN) 1 % topical gel Apply 2 g topically 4 times daily as needed for moderate pain APPLY TO BACK/LEGS       gabapentin (NEURONTIN) 300 MG capsule Take 300 mg by mouth 2 times daily       gemfibrozil (LOPID) 600 MG tablet TAKE 1 TABLET (600 MG) BY MOUTH 2 TIMES DAILY (BEFORE MEALS) 60 tablet 10     lidocaine (LIDODERM) 5 % patch Place 2 patches onto the skin every 24 hours To prevent lidocaine toxicity, patient should be patch free for 12 hrs daily. APPLY TO LOWER BACK       losartan (COZAAR) 100 MG tablet Take 100 mg by mouth daily       melatonin 3 MG tablet Take 3 mg by mouth At Bedtime       metFORMIN (GLUCOPHAGE) 1000 MG tablet Take 1,000 mg by mouth 2 times daily (with meals)       omeprazole (PRILOSEC) 20 MG DR capsule Take 20 mg by mouth 2 times daily       ondansetron (ZOFRAN) 4 MG tablet Take 4 mg by mouth every 6 hours as needed for nausea       polyethylene glycol (MIRALAX) 17 g packet Take 17 g by mouth daily as needed for constipation       sennosides (SENOKOT) 8.6 MG tablet Take 2 tablets by mouth daily as needed for constipation       thiamine (B-1) 100 MG tablet Take 100 mg by mouth daily       triamcinolone (KENALOG) 0.1 % external cream Apply topically 2 times daily APPLY TO RIGHT KNEE       venlafaxine (EFFEXOR) 50 MG tablet Take 75 mg by mouth daily           Case Management:  I have reviewed the care plan and MDS and do agree with the plan. Information reviewed:  Medications, vital signs, orders, and nursing notes.    ROS:  4 point ROS including Respiratory, CV, GI and , other than that noted in the HPI,  is negative    Vitals:  /82   Pulse 53   Temp 97.4  F (36.3  C)   Resp 18   Ht 1.753 m (5' 9\")   Wt 75.4 kg (166 lb 3.2 oz)   SpO2 96%   BMI 24.54 kg/m    Body mass index is 24.54 kg/m .  Exam:  GENERAL APPEARANCE:  Alert, in no distress  ENT:  " Mouth and posterior oropharynx normal, moist mucous membranes  EYES:  EOM normal, conjunctiva and lids normal  RESP:  no respiratory distress  CV:  regular rate and rhythm, no murmur, rub, or gallop, no edema  PSYCH:  insight and judgement impaired, memory impaired , affect and mood normal    Lab/Diagnostic data:   Recent labs in [facility] reviewed by me today.     ASSESSMENT/PLAN  (E11.65) Type 2 diabetes mellitus with hyperglycemia, without long-term current use of insulin (H)  (primary encounter diagnosis)  Comment: Patient now with some hyperglycemia and weight gain. His poor intake and weight loss started when Trulicity was increased to 3mg. His venlafaxine was also decreased around the same time. He did seem to tolerate the 1.5mg dose of Trulicity, appetite was appropriate. Will check A1c and consider restarting the lower dose.   Plan: A1c 9/9/24    (F10.96) Wernicke-Korsakoff syndrome (alcoholic) (H)  Comment: Chronic, progressive. Requires 24 hour skilled nursing care. HPI/ROS difficult to obtain with cognitive impairment. Expect further functional and cognitive decline. Expect weight loss.  Plan: Continue 24 hour care. Monitor weight. Monitor functional status. Monitor for behavioral disturbances.    (I10) Benign essential hypertension  Comment: Chronic, controlled  Plan: Continue current POC with no changes at this time and adjustments as needed.    (E78.2) Mixed hyperlipidemia  Comment: Atorvastatin is currently at a very low dose. Based on lipid panel from June, will increase dose to 10mg  Plan: Increase atorvastatin to 10mg daily      Orders:  BMP, A1c 9/9/24    Electronically signed by:  LONG Becerra CNP            Sincerely,        LONG Becerra CNP

## 2024-09-06 NOTE — PROGRESS NOTES
[facility] GERIATRICS  Chief Complaint   Patient presents with    [facility] Regulatory     [place] Medical Record Number:  [id number]  Place of Service where encounter took place:  [facility] () [[id number]]    HPI:    Román Houser  is 79 year old (1945), who is being seen today for a federally mandated E/M visit.     Today's concerns are:  Type 2 diabetes mellitus with hyperglycemia, without long-term current use of insulin (H)  Trulicity was stopped in June due to weight loss and poor appetite. Currently about 50% of his blood sugars are 200s.   Wt Readings from Last 4 Encounters:   09/06/24 75.4 kg (166 lb 3.2 oz)   07/01/24 74.3 kg (163 lb 12.8 oz)   06/26/24 74.3 kg (163 lb 12.8 oz)   06/07/24 73.1 kg (161 lb 3.2 oz)     Wernicke-Korsakoff syndrome (alcoholic) (H)  Patient's memory is very poor. No behavioral concerns per staff. BIMS 5/15. PHQ9 = 0    Benign essential hypertension  -140s/60-80s    Mixed hyperlipidemia  See lipid panel       ALLERGIES:Patient has no known allergies.  PAST MEDICAL HISTORY:   Past Medical History:   Diagnosis Date    AAA (abdominal aortic aneurysm) (H24)     Bilateral carpal tunnel syndrome     Cataract     Depression     Epiretinal membrane     ETOH abuse     Hip pain     Psoriasis     PTSD (post-traumatic stress disorder)     Sleep apnea      PAST SURGICAL HISTORY:   has a past surgical history that includes CE IOL LEFT EYE.  FAMILY HISTORY: family history is not on file.  SOCIAL HISTORY:  reports that he has quit smoking. He does not have any smokeless tobacco history on file.    MEDICATIONS:  Current Outpatient Medications   Medication Sig Dispense Refill    acetaminophen (TYLENOL) 500 MG tablet Take 1,000 mg by mouth 2 times daily      amLODIPine (NORVASC) 2.5 MG tablet Take 2.5 mg by mouth daily      aspirin 81 MG EC tablet Take 81 mg by mouth daily      atorvastatin (LIPITOR) 10 MG tablet Take 0.5 tablets (5 mg) by mouth daily 31 tablet 11  "   diclofenac (VOLTAREN) 1 % topical gel Apply 2 g topically 4 times daily as needed for moderate pain APPLY TO BACK/LEGS      gabapentin (NEURONTIN) 300 MG capsule Take 300 mg by mouth 2 times daily      gemfibrozil (LOPID) 600 MG tablet TAKE 1 TABLET (600 MG) BY MOUTH 2 TIMES DAILY (BEFORE MEALS) 60 tablet 10    lidocaine (LIDODERM) 5 % patch Place 2 patches onto the skin every 24 hours To prevent lidocaine toxicity, patient should be patch free for 12 hrs daily. APPLY TO LOWER BACK      losartan (COZAAR) 100 MG tablet Take 100 mg by mouth daily      melatonin 3 MG tablet Take 3 mg by mouth At Bedtime      metFORMIN (GLUCOPHAGE) 1000 MG tablet Take 1,000 mg by mouth 2 times daily (with meals)      omeprazole (PRILOSEC) 20 MG DR capsule Take 20 mg by mouth 2 times daily      ondansetron (ZOFRAN) 4 MG tablet Take 4 mg by mouth every 6 hours as needed for nausea      polyethylene glycol (MIRALAX) 17 g packet Take 17 g by mouth daily as needed for constipation      sennosides (SENOKOT) 8.6 MG tablet Take 2 tablets by mouth daily as needed for constipation      thiamine (B-1) 100 MG tablet Take 100 mg by mouth daily      triamcinolone (KENALOG) 0.1 % external cream Apply topically 2 times daily APPLY TO RIGHT KNEE      venlafaxine (EFFEXOR) 50 MG tablet Take 75 mg by mouth daily           Case Management:  I have reviewed the care plan and MDS and do agree with the plan. Information reviewed:  Medications, vital signs, orders, and nursing notes.    ROS:  4 point ROS including Respiratory, CV, GI and , other than that noted in the HPI,  is negative    Vitals:  /82   Pulse 53   Temp 97.4  F (36.3  C)   Resp 18   Ht 1.753 m (5' 9\")   Wt 75.4 kg (166 lb 3.2 oz)   SpO2 96%   BMI 24.54 kg/m    Body mass index is 24.54 kg/m .  Exam:  GENERAL APPEARANCE:  Alert, in no distress  ENT:  Mouth and posterior oropharynx normal, moist mucous membranes  EYES:  EOM normal, conjunctiva and lids normal  RESP:  no " respiratory distress  CV:  regular rate and rhythm, no murmur, rub, or gallop, no edema  PSYCH:  insight and judgement impaired, memory impaired , affect and mood normal    Lab/Diagnostic data:   Recent labs in [facility] reviewed by me today.     ASSESSMENT/PLAN  (E11.65) Type 2 diabetes mellitus with hyperglycemia, without long-term current use of insulin (H)  (primary encounter diagnosis)  Comment: Patient now with some hyperglycemia and weight gain. His poor intake and weight loss started when Trulicity was increased to 3mg. His venlafaxine was also decreased around the same time. He did seem to tolerate the 1.5mg dose of Trulicity, appetite was appropriate. Will check A1c and consider restarting the lower dose.   Plan: A1c 9/9/24    (F10.96) Wernicke-Korsakoff syndrome (alcoholic) (H)  Comment: Chronic, progressive. Requires 24 hour skilled nursing care. HPI/ROS difficult to obtain with cognitive impairment. Expect further functional and cognitive decline. Expect weight loss.  Plan: Continue 24 hour care. Monitor weight. Monitor functional status. Monitor for behavioral disturbances.    (I10) Benign essential hypertension  Comment: Chronic, controlled  Plan: Continue current POC with no changes at this time and adjustments as needed.    (E78.2) Mixed hyperlipidemia  Comment: Atorvastatin is currently at a very low dose. Based on lipid panel from June, will increase dose to 10mg  Plan: Increase atorvastatin to 10mg daily      Orders:  BMP, A1c 9/9/24    Electronically signed by:  LONG Becerra CNP

## 2024-09-09 LAB
ANION GAP SERPL CALCULATED.3IONS-SCNC: 12 MMOL/L (ref 7–15)
BUN SERPL-MCNC: 37.4 MG/DL (ref 8–23)
CALCIUM SERPL-MCNC: 9.7 MG/DL (ref 8.8–10.4)
CHLORIDE SERPL-SCNC: 107 MMOL/L (ref 98–107)
CREAT SERPL-MCNC: 1.29 MG/DL (ref 0.67–1.17)
EGFRCR SERPLBLD CKD-EPI 2021: 56 ML/MIN/1.73M2
GLUCOSE SERPL-MCNC: 158 MG/DL (ref 70–99)
HBA1C MFR BLD: 8.3 %
HCO3 SERPL-SCNC: 21 MMOL/L (ref 22–29)
POTASSIUM SERPL-SCNC: 5.2 MMOL/L (ref 3.4–5.3)
SODIUM SERPL-SCNC: 140 MMOL/L (ref 135–145)

## 2024-09-09 PROCEDURE — 83036 HEMOGLOBIN GLYCOSYLATED A1C: CPT | Performed by: INTERNAL MEDICINE

## 2024-09-09 PROCEDURE — 82565 ASSAY OF CREATININE: CPT | Performed by: INTERNAL MEDICINE

## 2024-09-09 PROCEDURE — 36415 COLL VENOUS BLD VENIPUNCTURE: CPT | Performed by: INTERNAL MEDICINE

## 2024-09-09 PROCEDURE — P9603 ONE-WAY ALLOW PRORATED MILES: HCPCS | Performed by: INTERNAL MEDICINE

## 2024-11-11 NOTE — PROGRESS NOTES
[facility] GERIATRICS  REGULATORY VISIT  November 12, 2024    [facility] Medical Record Number:  [id number]  Place of Service where encounter took place:  [facility] () [[id number]]    Chief Complaint   Patient presents with    [facility] Regulatory       HPI:    Román Houser is a 79 year old  (1945), who is being seen today for a federally mandated E/M visit at [facility] where he has resided since May 2022. HPI information obtained from: facility chart records, facility staff and [facility] chart review.     Today, Mr. Houser is seen in his room sound asleep in bed around 0930. I did not wake him as he is a limited historian. BIMS 6/15. Talked with nursing - no acute concerns today.     ALLERGIES:  No Known Allergies     Past Medical, Surgical, Family and Social History: Reviewed and updated in EPIC.    MEDICATIONS:  Current Outpatient Medications   Medication Sig Dispense Refill    acetaminophen (TYLENOL) 500 MG tablet Take 1,000 mg by mouth 2 times daily      amLODIPine (NORVASC) 2.5 MG tablet Take 2.5 mg by mouth daily      aspirin 81 MG EC tablet Take 81 mg by mouth daily      atorvastatin (LIPITOR) 10 MG tablet Take 0.5 tablets (5 mg) by mouth daily (Patient taking differently: Take 10 mg by mouth daily.) 31 tablet 11    gabapentin (NEURONTIN) 300 MG capsule Take 300 mg by mouth 2 times daily      gemfibrozil (LOPID) 600 MG tablet TAKE 1 TABLET (600 MG) BY MOUTH 2 TIMES DAILY (BEFORE MEALS) 60 tablet 10    Lidocaine (LIDOCARE) 4 % Patch Place 2 patches onto the skin every 24 hours. To prevent lidocaine toxicity, patient should be patch free for 12 hrs daily. APPLY TO LOWER BACK      losartan (COZAAR) 100 MG tablet Take 100 mg by mouth daily      melatonin 3 MG tablet Take 3 mg by mouth At Bedtime      metFORMIN (GLUCOPHAGE) 1000 MG tablet Take 1,000 mg by mouth 2 times daily (with meals)      omeprazole (PRILOSEC) 20 MG DR capsule Take 20 mg by mouth 2 times  "daily      ondansetron (ZOFRAN) 4 MG tablet Take 4 mg by mouth every 6 hours as needed for nausea      polyethylene glycol (MIRALAX) 17 g packet Take 17 g by mouth daily as needed for constipation      sennosides (SENOKOT) 8.6 MG tablet Take 2 tablets by mouth daily as needed for constipation      thiamine (B-1) 100 MG tablet Take 100 mg by mouth daily      triamcinolone (KENALOG) 0.1 % external cream Apply topically 2 times daily APPLY TO RIGHT KNEE      venlafaxine (EFFEXOR) 50 MG tablet Take 75 mg by mouth daily       Medications reviewed:  Medications reconciled to facility chart and changes were made to reflect current medications as identified as above med list. Below are the changes that were made:   Medications stopped since last EPIC medication reconciliation:   Medications Discontinued During This Encounter   Medication Reason    diclofenac (VOLTAREN) 1 % topical gel Med Rec(No AVS / No eCancel)     Medications started since last [facility] medication reconciliation:  No orders of the defined types were placed in this encounter.    REVIEW OF SYSTEMS:  Unable to be obtained as he was sound asleep    PHYSICAL EXAM:  /64   Pulse 63   Temp 98.1  F (36.7  C)   Resp 18   Ht 1.753 m (5' 9\")   Wt 76.1 kg (167 lb 12.8 oz)   SpO2 97%   BMI 24.78 kg/m    Gen: laying in bed, sound asleep and in no acute distress  Resp: breathing non labored, no tachypnea  Ext: no LE edema  Neuro: CX II-XII grossly in tact; ROM in all four extremities grossly in tact  Psych: memory, judgement and insight impaired    LABS/IMAGING: Reviewed as per Epic and/or [facility]    ASSESSMENT / PLAN:    Dementia Without Behavioral Disturbance  Major Recurrent Depression  Wernike-Korsakoff Syndrome  Clinical picture of anterograde amnesia plus imaging findings supported the diagnosis of W-K. Recent BIMS 3/15 and 6/15. Weight is down 20 lbs from a year ago and 30 lbs from two years ago.   -- thiamine 100 mg daily  -- venlafaxine 75 mg " daily  -- supportive cares     HTN   SBPs 110s-130s. HR 60s. Weight is down 20 lbs from a year ago and 30 lbs from two years ago. BMP at baseline in Sept.   -- amlodipine 2.5 mg daily, losartan 100 mg daily   -- follow BPs and adjust medications as needed     CKD, Stage III  Baseline Cr 1.1-1.3. Cr 1.29 in Sept. He is on ARB.   -- avoid nephrotoxic meds  -- periodic BMP    Electronically signed by  Mona Davalos MD

## 2024-11-12 ENCOUNTER — NURSING HOME VISIT (OUTPATIENT)
Dept: GERIATRICS | Facility: CLINIC | Age: 79
End: 2024-11-12
Payer: COMMERCIAL

## 2024-11-12 VITALS
DIASTOLIC BLOOD PRESSURE: 64 MMHG | HEART RATE: 63 BPM | TEMPERATURE: 98.1 F | BODY MASS INDEX: 24.85 KG/M2 | HEIGHT: 69 IN | RESPIRATION RATE: 18 BRPM | WEIGHT: 167.8 LBS | OXYGEN SATURATION: 97 % | SYSTOLIC BLOOD PRESSURE: 122 MMHG

## 2024-11-12 DIAGNOSIS — F03.90 DEMENTIA WITHOUT BEHAVIORAL DISTURBANCE, PSYCHOTIC DISTURBANCE, MOOD DISTURBANCE, OR ANXIETY, UNSPECIFIED DEMENTIA SEVERITY, UNSPECIFIED DEMENTIA TYPE (H): ICD-10-CM

## 2024-11-12 DIAGNOSIS — F33.9 RECURRENT MAJOR DEPRESSIVE DISORDER, REMISSION STATUS UNSPECIFIED (H): ICD-10-CM

## 2024-11-12 DIAGNOSIS — N18.31 STAGE 3A CHRONIC KIDNEY DISEASE (H): ICD-10-CM

## 2024-11-12 DIAGNOSIS — I12.9 BENIGN HYPERTENSIVE KIDNEY DISEASE WITH CHRONIC KIDNEY DISEASE STAGE I THROUGH STAGE IV, OR UNSPECIFIED: Primary | ICD-10-CM

## 2024-11-12 DIAGNOSIS — F04 WERNICKE-KORSAKOFF SYNDROME (H): ICD-10-CM

## 2024-11-12 PROCEDURE — 99309 SBSQ NF CARE MODERATE MDM 30: CPT | Performed by: INTERNAL MEDICINE

## 2024-11-12 NOTE — LETTER
11/12/2024      Román Houser  [facility]  [address]  [place] [address]        M [facility] GERIATRICS  REGULATORY VISIT  November 12, 2024    [facility] Medical Record Number:  [id number]  Place of Service where encounter took place:  [facility] () [[id number]]    Chief Complaint   Patient presents with     [facility] Regulatory       HPI:    Román Houser is a 79 year old  (1945), who is being seen today for a federally mandated E/M visit at [facility] where he has resided since May 2022. HPI information obtained from: facility chart records, facility staff and [facility] chart review.     Today, Mr. Houser is seen in his room sound asleep in bed around 0930. I did not wake him as he is a limited historian. BIMS 6/15. Talked with nursing - no acute concerns today.     ALLERGIES:  No Known Allergies     Past Medical, Surgical, Family and Social History: Reviewed and updated in EPIC.    MEDICATIONS:  Current Outpatient Medications   Medication Sig Dispense Refill     acetaminophen (TYLENOL) 500 MG tablet Take 1,000 mg by mouth 2 times daily       amLODIPine (NORVASC) 2.5 MG tablet Take 2.5 mg by mouth daily       aspirin 81 MG EC tablet Take 81 mg by mouth daily       atorvastatin (LIPITOR) 10 MG tablet Take 0.5 tablets (5 mg) by mouth daily (Patient taking differently: Take 10 mg by mouth daily.) 31 tablet 11     gabapentin (NEURONTIN) 300 MG capsule Take 300 mg by mouth 2 times daily       gemfibrozil (LOPID) 600 MG tablet TAKE 1 TABLET (600 MG) BY MOUTH 2 TIMES DAILY (BEFORE MEALS) 60 tablet 10     Lidocaine (LIDOCARE) 4 % Patch Place 2 patches onto the skin every 24 hours. To prevent lidocaine toxicity, patient should be patch free for 12 hrs daily. APPLY TO LOWER BACK       losartan (COZAAR) 100 MG tablet Take 100 mg by mouth daily       melatonin 3 MG tablet Take 3 mg by mouth At Bedtime       metFORMIN (GLUCOPHAGE) 1000 MG tablet Take 1,000  "mg by mouth 2 times daily (with meals)       omeprazole (PRILOSEC) 20 MG DR capsule Take 20 mg by mouth 2 times daily       ondansetron (ZOFRAN) 4 MG tablet Take 4 mg by mouth every 6 hours as needed for nausea       polyethylene glycol (MIRALAX) 17 g packet Take 17 g by mouth daily as needed for constipation       sennosides (SENOKOT) 8.6 MG tablet Take 2 tablets by mouth daily as needed for constipation       thiamine (B-1) 100 MG tablet Take 100 mg by mouth daily       triamcinolone (KENALOG) 0.1 % external cream Apply topically 2 times daily APPLY TO RIGHT KNEE       venlafaxine (EFFEXOR) 50 MG tablet Take 75 mg by mouth daily       Medications reviewed:  Medications reconciled to facility chart and changes were made to reflect current medications as identified as above med list. Below are the changes that were made:   Medications stopped since last EPIC medication reconciliation:   Medications Discontinued During This Encounter   Medication Reason     diclofenac (VOLTAREN) 1 % topical gel Med Rec(No AVS / No eCancel)     Medications started since last [facility] medication reconciliation:  No orders of the defined types were placed in this encounter.    REVIEW OF SYSTEMS:  Unable to be obtained as he was sound asleep    PHYSICAL EXAM:  /64   Pulse 63   Temp 98.1  F (36.7  C)   Resp 18   Ht 1.753 m (5' 9\")   Wt 76.1 kg (167 lb 12.8 oz)   SpO2 97%   BMI 24.78 kg/m    Gen: laying in bed, sound asleep and in no acute distress  Resp: breathing non labored, no tachypnea  Ext: no LE edema  Neuro: CX II-XII grossly in tact; ROM in all four extremities grossly in tact  Psych: memory, judgement and insight impaired    LABS/IMAGING: Reviewed as per Epic and/or [facility]    ASSESSMENT / PLAN:    Dementia Without Behavioral Disturbance  Major Recurrent Depression  Wernike-Korsakoff Syndrome  Clinical picture of anterograde amnesia plus imaging findings supported the diagnosis of W-K. Recent BIMS 3/15 and 6/15. " Weight is down 20 lbs from a year ago and 30 lbs from two years ago.   -- thiamine 100 mg daily  -- venlafaxine 75 mg daily  -- supportive cares     HTN   SBPs 110s-130s. HR 60s. Weight is down 20 lbs from a year ago and 30 lbs from two years ago. BMP at baseline in Sept.   -- amlodipine 2.5 mg daily, losartan 100 mg daily   -- follow BPs and adjust medications as needed     CKD, Stage III  Baseline Cr 1.1-1.3. Cr 1.29 in Sept. He is on ARB.   -- avoid nephrotoxic meds  -- periodic BMP    Electronically signed by  Mona Davalos MD              Sincerely,        Mona Davalos MD

## 2025-01-06 ENCOUNTER — LAB REQUISITION (OUTPATIENT)
Dept: LAB | Facility: CLINIC | Age: 80
End: 2025-01-06

## 2025-01-06 DIAGNOSIS — Z20.828 CONTACT WITH AND (SUSPECTED) EXPOSURE TO OTHER VIRAL COMMUNICABLE DISEASES: ICD-10-CM

## 2025-01-06 LAB
FLUAV RNA SPEC QL NAA+PROBE: NEGATIVE
FLUBV RNA RESP QL NAA+PROBE: NEGATIVE
RSV RNA SPEC NAA+PROBE: NEGATIVE
SARS-COV-2 RNA RESP QL NAA+PROBE: NEGATIVE

## 2025-01-06 PROCEDURE — 87637 SARSCOV2&INF A&B&RSV AMP PRB: CPT | Performed by: INTERNAL MEDICINE

## 2025-01-13 ENCOUNTER — NURSING HOME VISIT (OUTPATIENT)
Dept: GERIATRICS | Facility: CLINIC | Age: 80
End: 2025-01-13
Payer: COMMERCIAL

## 2025-01-13 VITALS
DIASTOLIC BLOOD PRESSURE: 61 MMHG | BODY MASS INDEX: 25.36 KG/M2 | OXYGEN SATURATION: 97 % | HEART RATE: 61 BPM | TEMPERATURE: 97.9 F | HEIGHT: 69 IN | WEIGHT: 171.2 LBS | RESPIRATION RATE: 18 BRPM | SYSTOLIC BLOOD PRESSURE: 141 MMHG

## 2025-01-13 DIAGNOSIS — F03.90 DEMENTIA WITHOUT BEHAVIORAL DISTURBANCE, PSYCHOTIC DISTURBANCE, MOOD DISTURBANCE, OR ANXIETY, UNSPECIFIED DEMENTIA SEVERITY, UNSPECIFIED DEMENTIA TYPE (H): ICD-10-CM

## 2025-01-13 DIAGNOSIS — E11.69 TYPE 2 DIABETES MELLITUS WITH OTHER SPECIFIED COMPLICATION, WITHOUT LONG-TERM CURRENT USE OF INSULIN (H): Primary | ICD-10-CM

## 2025-01-13 DIAGNOSIS — I10 BENIGN ESSENTIAL HYPERTENSION: ICD-10-CM

## 2025-01-13 DIAGNOSIS — N18.31 STAGE 3A CHRONIC KIDNEY DISEASE (H): ICD-10-CM

## 2025-01-13 PROBLEM — F10.96 WERNICKE-KORSAKOFF SYNDROME (ALCOHOLIC) (H): Status: RESOLVED | Noted: 2024-01-22 | Resolved: 2025-01-13

## 2025-01-13 PROCEDURE — 99309 SBSQ NF CARE MODERATE MDM 30: CPT | Performed by: NURSE PRACTITIONER

## 2025-01-13 RX ORDER — ATORVASTATIN CALCIUM 10 MG/1
10 TABLET, FILM COATED ORAL DAILY
Status: SHIPPED
Start: 2025-01-13

## 2025-01-13 NOTE — PROGRESS NOTES
[facility] GERIATRICS  Chief Complaint   Patient presents with    group home Regulatory     [facility] Medical Record Number:  [id number]  Place of Service where encounter took place:  [facility] () [[id number]]    HPI:    Román Houser  is 79 year old (1945), who is being seen today for a federally mandated E/M visit.     Today's concerns are:  Type 2 diabetes mellitus with other specified complication, without long-term current use of insulin (H)  Benign essential hypertension  Stage 3a chronic kidney disease (H)  Dementia without behavioral disturbance, psychotic disturbance, mood disturbance, or anxiety, unspecified dementia severity, unspecified dementia type (H)  Patient is resting in bed, says he is doing well. He denies all ROS questions. He is later seen walking in the hallway on his own with no device.   Wt Readings from Last 4 Encounters:   01/13/25 77.7 kg (171 lb 3.2 oz)   11/12/24 76.1 kg (167 lb 12.8 oz)   09/06/24 75.4 kg (166 lb 3.2 oz)   07/01/24 74.3 kg (163 lb 12.8 oz)         ALLERGIES:Patient has no known allergies.  PAST MEDICAL HISTORY:   Past Medical History:   Diagnosis Date    AAA (abdominal aortic aneurysm)     Bilateral carpal tunnel syndrome     Cataract     Depression     Epiretinal membrane     ETOH abuse     Hip pain     Psoriasis     PTSD (post-traumatic stress disorder)     Sleep apnea      PAST SURGICAL HISTORY:   has a past surgical history that includes CE IOL LEFT EYE.  FAMILY HISTORY: family history is not on file.  SOCIAL HISTORY:  reports that he has quit smoking. He does not have any smokeless tobacco history on file.    MEDICATIONS:  Current Outpatient Medications   Medication Sig Dispense Refill    acetaminophen (TYLENOL) 500 MG tablet Take 1,000 mg by mouth 2 times daily      amLODIPine (NORVASC) 2.5 MG tablet Take 2.5 mg by mouth daily      aspirin 81 MG EC tablet Take 81 mg by mouth daily      atorvastatin (LIPITOR) 10 MG tablet Take 1 tablet (10  "mg) by mouth daily.      gabapentin (NEURONTIN) 300 MG capsule Take 300 mg by mouth 2 times daily      gemfibrozil (LOPID) 600 MG tablet TAKE 1 TABLET (600 MG) BY MOUTH 2 TIMES DAILY (BEFORE MEALS) 60 tablet 10    Lidocaine (LIDOCARE) 4 % Patch Place 2 patches onto the skin every 24 hours. To prevent lidocaine toxicity, patient should be patch free for 12 hrs daily. APPLY TO LOWER BACK      losartan (COZAAR) 100 MG tablet Take 100 mg by mouth daily      melatonin 3 MG tablet Take 3 mg by mouth At Bedtime      metFORMIN (GLUCOPHAGE) 1000 MG tablet Take 1,000 mg by mouth 2 times daily (with meals)      omeprazole (PRILOSEC) 20 MG DR capsule Take 20 mg by mouth daily.      ondansetron (ZOFRAN) 4 MG tablet Take 4 mg by mouth every 6 hours as needed for nausea      polyethylene glycol (MIRALAX) 17 g packet Take 17 g by mouth daily as needed for constipation      sennosides (SENOKOT) 8.6 MG tablet Take 2 tablets by mouth daily as needed for constipation      thiamine (B-1) 100 MG tablet Take 100 mg by mouth daily      triamcinolone (KENALOG) 0.1 % external cream Apply topically 2 times daily APPLY TO RIGHT KNEE      venlafaxine (EFFEXOR) 50 MG tablet Take 75 mg by mouth daily           Case Management:  I have reviewed the care plan and MDS and do agree with the plan. Information reviewed:  Medications, vital signs, orders, and nursing notes.    ROS:  4 point ROS including Respiratory, CV, GI and , other than that noted in the HPI,  is negative    Vitals:  BP (!) 141/61   Pulse 61   Temp 97.9  F (36.6  C)   Resp 18   Ht 1.753 m (5' 9\")   Wt 77.7 kg (171 lb 3.2 oz)   SpO2 97%   BMI 25.28 kg/m    Body mass index is 25.28 kg/m .  Exam:  GENERAL APPEARANCE:  Alert, in no distress  ENT:  Mouth and posterior oropharynx normal, moist mucous membranes  EYES:  EOM normal, conjunctiva and lids normal  RESP:  no respiratory distress  CV:  no edema  SKIN:  Inspection of skin and subcutaneous tissue baseline  PSYCH:  insight " and judgement impaired, memory impaired , affect and mood normal    Lab/Diagnostic data:   Recent labs in [facility] reviewed by me today.     ASSESSMENT/PLAN  (E11.69) Type 2 diabetes mellitus with other specified complication, without long-term current use of insulin (H)  (primary encounter diagnosis)  Comment: Trulicity was stopped in July due to FTT, significant weight loss. He is gaining weight, but is still below his admission weight of 175 lbs. Would hesitate to restart the Trulicity, but he did not see to have such weight loss on 1.5mg. Will recheck A1c. If it is 8.5% or higher, will again try the lowest dose   Plan: Check A1c    (I10) Benign essential hypertension  Comment: Chronic, controlled  Plan: Continue current POC with no changes at this time and adjustments as needed.    (N18.31) Stage 3a chronic kidney disease (H)  Comment: Chronic Kidney Disease due to: Diabetes  and Hypertension.  Plan: Avoid nephrotoxic medications and adjust medications per renal function. Monitor renal function every 3-6 months. Refer to plan of care for Diabetes  and Hypertension.    (F03.90) Dementia without behavioral disturbance, psychotic disturbance, mood disturbance, or anxiety, unspecified dementia severity, unspecified dementia type (H)  Comment: Chronic, progressive. Requires 24 hour skilled nursing care. HPI/ROS limited with cognitive impairment. Expect further functional and cognitive decline.   Plan: Continue 24 hour care. Monitor functional status. Monitor for behavioral disturbances.      Orders  BMP, A1c 1/15/25      Electronically signed by:  LONG Becerra CNP

## 2025-01-13 NOTE — LETTER
1/13/2025      Román Houser  [facility]  [address]  [place] [address]        M [facility] GERIATRICS  Chief Complaint   Patient presents with     [facility] Regulatory     [facility] Medical Record Number:  [id number]  Place of Service where encounter took place:  [facility] () [[id number]]    HPI:    Román Houser  is 79 year old (1945), who is being seen today for a federally mandated E/M visit.     Today's concerns are:  Type 2 diabetes mellitus with other specified complication, without long-term current use of insulin (H)  Benign essential hypertension  Stage 3a chronic kidney disease (H)  Dementia without behavioral disturbance, psychotic disturbance, mood disturbance, or anxiety, unspecified dementia severity, unspecified dementia type (H)  Patient is resting in bed, says he is doing well. He denies all ROS questions. He is later seen walking in the hallway on his own with no device.   Wt Readings from Last 4 Encounters:   01/13/25 77.7 kg (171 lb 3.2 oz)   11/12/24 76.1 kg (167 lb 12.8 oz)   09/06/24 75.4 kg (166 lb 3.2 oz)   07/01/24 74.3 kg (163 lb 12.8 oz)         ALLERGIES:Patient has no known allergies.  PAST MEDICAL HISTORY:   Past Medical History:   Diagnosis Date     AAA (abdominal aortic aneurysm)      Bilateral carpal tunnel syndrome      Cataract      Depression      Epiretinal membrane      ETOH abuse      Hip pain      Psoriasis      PTSD (post-traumatic stress disorder)      Sleep apnea      PAST SURGICAL HISTORY:   has a past surgical history that includes CE IOL LEFT EYE.  FAMILY HISTORY: family history is not on file.  SOCIAL HISTORY:  reports that he has quit smoking. He does not have any smokeless tobacco history on file.    MEDICATIONS:  Current Outpatient Medications   Medication Sig Dispense Refill     acetaminophen (TYLENOL) 500 MG tablet Take 1,000 mg by mouth 2 times daily       amLODIPine (NORVASC) 2.5 MG tablet Take 2.5 mg by mouth daily  "      aspirin 81 MG EC tablet Take 81 mg by mouth daily       atorvastatin (LIPITOR) 10 MG tablet Take 1 tablet (10 mg) by mouth daily.       gabapentin (NEURONTIN) 300 MG capsule Take 300 mg by mouth 2 times daily       gemfibrozil (LOPID) 600 MG tablet TAKE 1 TABLET (600 MG) BY MOUTH 2 TIMES DAILY (BEFORE MEALS) 60 tablet 10     Lidocaine (LIDOCARE) 4 % Patch Place 2 patches onto the skin every 24 hours. To prevent lidocaine toxicity, patient should be patch free for 12 hrs daily. APPLY TO LOWER BACK       losartan (COZAAR) 100 MG tablet Take 100 mg by mouth daily       melatonin 3 MG tablet Take 3 mg by mouth At Bedtime       metFORMIN (GLUCOPHAGE) 1000 MG tablet Take 1,000 mg by mouth 2 times daily (with meals)       omeprazole (PRILOSEC) 20 MG DR capsule Take 20 mg by mouth daily.       ondansetron (ZOFRAN) 4 MG tablet Take 4 mg by mouth every 6 hours as needed for nausea       polyethylene glycol (MIRALAX) 17 g packet Take 17 g by mouth daily as needed for constipation       sennosides (SENOKOT) 8.6 MG tablet Take 2 tablets by mouth daily as needed for constipation       thiamine (B-1) 100 MG tablet Take 100 mg by mouth daily       triamcinolone (KENALOG) 0.1 % external cream Apply topically 2 times daily APPLY TO RIGHT KNEE       venlafaxine (EFFEXOR) 50 MG tablet Take 75 mg by mouth daily           Case Management:  I have reviewed the care plan and MDS and do agree with the plan. Information reviewed:  Medications, vital signs, orders, and nursing notes.    ROS:  4 point ROS including Respiratory, CV, GI and , other than that noted in the HPI,  is negative    Vitals:  BP (!) 141/61   Pulse 61   Temp 97.9  F (36.6  C)   Resp 18   Ht 1.753 m (5' 9\")   Wt 77.7 kg (171 lb 3.2 oz)   SpO2 97%   BMI 25.28 kg/m    Body mass index is 25.28 kg/m .  Exam:  GENERAL APPEARANCE:  Alert, in no distress  ENT:  Mouth and posterior oropharynx normal, moist mucous membranes  EYES:  EOM normal, conjunctiva and lids " normal  RESP:  no respiratory distress  CV:  no edema  SKIN:  Inspection of skin and subcutaneous tissue baseline  PSYCH:  insight and judgement impaired, memory impaired , affect and mood normal    Lab/Diagnostic data:   Recent labs in [facility] reviewed by me today.     ASSESSMENT/PLAN  (E11.69) Type 2 diabetes mellitus with other specified complication, without long-term current use of insulin (H)  (primary encounter diagnosis)  Comment: Trulicity was stopped in July due to FTT, significant weight loss. He is gaining weight, but is still below his admission weight of 175 lbs. Would hesitate to restart the Trulicity, but he did not see to have such weight loss on 1.5mg. Will recheck A1c. If it is 8.5% or higher, will again try the lowest dose   Plan: Check A1c    (I10) Benign essential hypertension  Comment: Chronic, controlled  Plan: Continue current POC with no changes at this time and adjustments as needed.    (N18.31) Stage 3a chronic kidney disease (H)  Comment: Chronic Kidney Disease due to: Diabetes  and Hypertension.  Plan: Avoid nephrotoxic medications and adjust medications per renal function. Monitor renal function every 3-6 months. Refer to plan of care for Diabetes  and Hypertension.    (F03.90) Dementia without behavioral disturbance, psychotic disturbance, mood disturbance, or anxiety, unspecified dementia severity, unspecified dementia type (H)  Comment: Chronic, progressive. Requires 24 hour skilled nursing care. HPI/ROS limited with cognitive impairment. Expect further functional and cognitive decline.   Plan: Continue 24 hour care. Monitor functional status. Monitor for behavioral disturbances.      Orders  BMP, A1c 1/15/25      Electronically signed by:  LONG Becerra CNP            Sincerely,        LONG Becerra CNP    Electronically signed

## 2025-01-14 ENCOUNTER — LAB REQUISITION (OUTPATIENT)
Dept: LAB | Facility: CLINIC | Age: 80
End: 2025-01-14
Payer: COMMERCIAL

## 2025-01-14 DIAGNOSIS — E11.40 TYPE 2 DIABETES MELLITUS WITH DIABETIC NEUROPATHY, UNSPECIFIED (H): ICD-10-CM

## 2025-01-15 ENCOUNTER — TELEPHONE (OUTPATIENT)
Dept: GERIATRICS | Facility: CLINIC | Age: 80
End: 2025-01-15
Payer: COMMERCIAL

## 2025-01-15 DIAGNOSIS — E11.69 TYPE 2 DIABETES MELLITUS WITH OTHER SPECIFIED COMPLICATION, WITHOUT LONG-TERM CURRENT USE OF INSULIN (H): Primary | ICD-10-CM

## 2025-01-15 LAB
ANION GAP SERPL CALCULATED.3IONS-SCNC: 12 MMOL/L (ref 7–15)
BUN SERPL-MCNC: 39.3 MG/DL (ref 8–23)
CALCIUM SERPL-MCNC: 9.9 MG/DL (ref 8.8–10.4)
CHLORIDE SERPL-SCNC: 104 MMOL/L (ref 98–107)
CREAT SERPL-MCNC: 1.27 MG/DL (ref 0.67–1.17)
EGFRCR SERPLBLD CKD-EPI 2021: 57 ML/MIN/1.73M2
EST. AVERAGE GLUCOSE BLD GHB EST-MCNC: 220 MG/DL
GLUCOSE SERPL-MCNC: 218 MG/DL (ref 70–99)
HBA1C MFR BLD: 9.3 %
HCO3 SERPL-SCNC: 23 MMOL/L (ref 22–29)
POTASSIUM SERPL-SCNC: 5.9 MMOL/L (ref 3.4–5.3)
SODIUM SERPL-SCNC: 139 MMOL/L (ref 135–145)

## 2025-01-15 PROCEDURE — 83036 HEMOGLOBIN GLYCOSYLATED A1C: CPT | Performed by: INTERNAL MEDICINE

## 2025-01-15 PROCEDURE — 80048 BASIC METABOLIC PNL TOTAL CA: CPT | Performed by: INTERNAL MEDICINE

## 2025-01-15 PROCEDURE — P9603 ONE-WAY ALLOW PRORATED MILES: HCPCS | Performed by: INTERNAL MEDICINE

## 2025-01-15 PROCEDURE — 36415 COLL VENOUS BLD VENIPUNCTURE: CPT | Performed by: INTERNAL MEDICINE

## 2025-01-15 NOTE — CONFIDENTIAL NOTE
A1c elevated at 9.3%. Will restart Trulicity at 1.5mg, but would not recommend any higher dose due to history of severe weight loss on 3mg dose.

## 2025-03-20 ENCOUNTER — NURSING HOME VISIT (OUTPATIENT)
Dept: GERIATRICS | Facility: CLINIC | Age: 80
End: 2025-03-20
Payer: COMMERCIAL

## 2025-03-20 VITALS
WEIGHT: 166.2 LBS | HEART RATE: 56 BPM | OXYGEN SATURATION: 98 % | TEMPERATURE: 98.1 F | DIASTOLIC BLOOD PRESSURE: 72 MMHG | BODY MASS INDEX: 24.62 KG/M2 | HEIGHT: 69 IN | RESPIRATION RATE: 18 BRPM | SYSTOLIC BLOOD PRESSURE: 128 MMHG

## 2025-03-20 RX ORDER — LIRAGLUTIDE 6 MG/ML
0.6 INJECTION SUBCUTANEOUS DAILY
COMMUNITY

## 2025-03-20 NOTE — PROGRESS NOTES
[facility] GERIATRICS  REGULATORY VISIT  March 20, 2025      [facility] Medical Record Number:  [id number]  Place of Service where encounter took place:  No question data found.    No chief complaint on file.      HPI:    Román Houser is a 79 year old  (1945), who is being seen today for a federally mandated E/M visit. HPI information obtained from: {FGS HPI:060917}.    Today, ***      ALLERGIES:  No Known Allergies     Past Medical, Surgical, Family and Social History: Reviewed and updated in EPIC.    MEDICATIONS:  Post Discharge Medication Reconciliation Status: {[facility] Med Rec (Provider):319081}. ***    Current Outpatient Medications   Medication Sig Dispense Refill    liraglutide (VICTOZA) 18 MG/3ML solution Inject 0.6 mg subcutaneously daily.      acetaminophen (TYLENOL) 500 MG tablet Take 1,000 mg by mouth 2 times daily      amLODIPine (NORVASC) 2.5 MG tablet Take 2.5 mg by mouth daily      aspirin 81 MG EC tablet Take 81 mg by mouth daily      atorvastatin (LIPITOR) 10 MG tablet Take 1 tablet (10 mg) by mouth daily.      gabapentin (NEURONTIN) 300 MG capsule Take 300 mg by mouth 2 times daily      gemfibrozil (LOPID) 600 MG tablet TAKE 1 TABLET (600 MG) BY MOUTH 2 TIMES DAILY (BEFORE MEALS) 60 tablet 10    Lidocaine (LIDOCARE) 4 % Patch Place 2 patches onto the skin every 24 hours. To prevent lidocaine toxicity, patient should be patch free for 12 hrs daily. APPLY TO LOWER BACK      losartan (COZAAR) 100 MG tablet Take 100 mg by mouth daily      melatonin 3 MG tablet Take 3 mg by mouth At Bedtime      metFORMIN (GLUCOPHAGE) 1000 MG tablet Take 1,000 mg by mouth 2 times daily (with meals)      omeprazole (PRILOSEC) 20 MG DR capsule Take 20 mg by mouth daily.      ondansetron (ZOFRAN) 4 MG tablet Take 4 mg by mouth every 6 hours as needed for nausea      polyethylene glycol (MIRALAX) 17 g packet Take 17 g by mouth daily as needed for constipation      sennosides (SENOKOT) 8.6 MG tablet Take 2  tablets by mouth daily as needed for constipation      thiamine (B-1) 100 MG tablet Take 100 mg by mouth daily      triamcinolone (KENALOG) 0.1 % external cream Apply topically 2 times daily APPLY TO RIGHT KNEE      venlafaxine (EFFEXOR) 50 MG tablet Take 75 mg by mouth daily       Medications reviewed:  Medications reconciled to facility chart and changes were made to reflect current medications as identified as above med list. Below are the changes that were made:   Medications stopped since last EPIC medication reconciliation:   Medications Discontinued During This Encounter   Medication Reason    dulaglutide (TRULICITY) 1.5 MG/0.5ML pen Med Rec(No AVS / No eCancel)     Medications started since last [facility] medication reconciliation:  Orders Placed This Encounter   Medications    liraglutide (VICTOZA) 18 MG/3ML solution     Sig: Inject 0.6 mg subcutaneously daily.     ***    REVIEW OF SYSTEMS:  4 point ROS neg other than the symptoms noted above in the HPI.***  Unable to be obtained due to cognitive impairment or aphasia.     PHYSICAL EXAM:  There were no vitals taken for this visit.  Gen: sitting in bed ***wheelchair, alert, cooperative and in no acute distress  HEENT: *** hearing acuity  Card: RRR, S1, S2, no murmurs  Resp: lungs clear to auscultation bilaterally ***anteriorly and laterally, no crackles or wheezes; moving good air  GI: abdomen soft, not-tender, non-distended, +BS  Ext: no LE edema  Neuro: CX II-XII grossly in tact; ROM in all four extremities grossly in tact  Psych: alert and oriented x3; normal affect ***memory, judgement and insight impaired ***alert and oriented to self and general situation    LABS/IMAGING: Reviewed as per Epic and/or [facility]    ASSESSMENT / PLAN:  {FGS DX:575212}    Orders:  ***    Electronically signed by  Mona Davalos MD

## 2025-03-20 NOTE — LETTER
3/20/2025      Román Houser  [facility]  [address]  [facility] [address]        [facility] GERIATRICS  REGULATORY VISIT  March 20, 2025      [facility] Medical Record Number:  [id number]  Place of Service where encounter took place:  No question data found.    No chief complaint on file.      HPI:    Román Houser is a 79 year old  (1945), who is being seen today for a federally mandated E/M visit. HPI information obtained from: {FGS HPI:615566}.    Today, ***      ALLERGIES:  No Known Allergies     Past Medical, Surgical, Family and Social History: Reviewed and updated in EPIC.    MEDICATIONS:  Post Discharge Medication Reconciliation Status: {[facility] Med Rec (Provider):922238}. ***    Current Outpatient Medications   Medication Sig Dispense Refill    liraglutide (VICTOZA) 18 MG/3ML solution Inject 0.6 mg subcutaneously daily.      acetaminophen (TYLENOL) 500 MG tablet Take 1,000 mg by mouth 2 times daily      amLODIPine (NORVASC) 2.5 MG tablet Take 2.5 mg by mouth daily      aspirin 81 MG EC tablet Take 81 mg by mouth daily      atorvastatin (LIPITOR) 10 MG tablet Take 1 tablet (10 mg) by mouth daily.      gabapentin (NEURONTIN) 300 MG capsule Take 300 mg by mouth 2 times daily      gemfibrozil (LOPID) 600 MG tablet TAKE 1 TABLET (600 MG) BY MOUTH 2 TIMES DAILY (BEFORE MEALS) 60 tablet 10    Lidocaine (LIDOCARE) 4 % Patch Place 2 patches onto the skin every 24 hours. To prevent lidocaine toxicity, patient should be patch free for 12 hrs daily. APPLY TO LOWER BACK      losartan (COZAAR) 100 MG tablet Take 100 mg by mouth daily      melatonin 3 MG tablet Take 3 mg by mouth At Bedtime      metFORMIN (GLUCOPHAGE) 1000 MG tablet Take 1,000 mg by mouth 2 times daily (with meals)      omeprazole (PRILOSEC) 20 MG DR capsule Take 20 mg by mouth daily.      ondansetron (ZOFRAN) 4 MG tablet Take 4 mg by mouth every 6 hours as needed for nausea      polyethylene glycol (MIRALAX) 17 g  packet Take 17 g by mouth daily as needed for constipation      sennosides (SENOKOT) 8.6 MG tablet Take 2 tablets by mouth daily as needed for constipation      thiamine (B-1) 100 MG tablet Take 100 mg by mouth daily      triamcinolone (KENALOG) 0.1 % external cream Apply topically 2 times daily APPLY TO RIGHT KNEE      venlafaxine (EFFEXOR) 50 MG tablet Take 75 mg by mouth daily       Medications reviewed:  Medications reconciled to facility chart and changes were made to reflect current medications as identified as above med list. Below are the changes that were made:   Medications stopped since last EPIC medication reconciliation:   Medications Discontinued During This Encounter   Medication Reason    dulaglutide (TRULICITY) 1.5 MG/0.5ML pen Med Rec(No AVS / No eCancel)     Medications started since last [facility] medication reconciliation:  Orders Placed This Encounter   Medications    liraglutide (VICTOZA) 18 MG/3ML solution     Sig: Inject 0.6 mg subcutaneously daily.     ***    REVIEW OF SYSTEMS:  4 point ROS neg other than the symptoms noted above in the HPI.***  Unable to be obtained due to cognitive impairment or aphasia.     PHYSICAL EXAM:  There were no vitals taken for this visit.  Gen: sitting in bed ***wheelchair, alert, cooperative and in no acute distress  HEENT: *** hearing acuity  Card: RRR, S1, S2, no murmurs  Resp: lungs clear to auscultation bilaterally ***anteriorly and laterally, no crackles or wheezes; moving good air  GI: abdomen soft, not-tender, non-distended, +BS  Ext: no LE edema  Neuro: CX II-XII grossly in tact; ROM in all four extremities grossly in tact  Psych: alert and oriented x3; normal affect ***memory, judgement and insight impaired ***alert and oriented to self and general situation    LABS/IMAGING: Reviewed as per Epic and/or [facility]    ASSESSMENT / PLAN:  {[facility] DX:285235}    Orders:  ***    Electronically signed by  Mona Davalos MD              Sincerely,        Mona  Mannie Davalos MD    Electronically signed

## 2025-05-12 ENCOUNTER — NURSING HOME VISIT (OUTPATIENT)
Dept: GERIATRICS | Facility: CLINIC | Age: 80
End: 2025-05-12
Payer: COMMERCIAL

## 2025-05-12 VITALS
OXYGEN SATURATION: 97 % | DIASTOLIC BLOOD PRESSURE: 71 MMHG | SYSTOLIC BLOOD PRESSURE: 112 MMHG | HEART RATE: 67 BPM | WEIGHT: 161 LBS | TEMPERATURE: 98.6 F | HEIGHT: 69 IN | BODY MASS INDEX: 23.85 KG/M2 | RESPIRATION RATE: 18 BRPM

## 2025-05-12 DIAGNOSIS — F03.90 DEMENTIA WITHOUT BEHAVIORAL DISTURBANCE, PSYCHOTIC DISTURBANCE, MOOD DISTURBANCE, OR ANXIETY, UNSPECIFIED DEMENTIA SEVERITY, UNSPECIFIED DEMENTIA TYPE (H): ICD-10-CM

## 2025-05-12 DIAGNOSIS — I10 BENIGN ESSENTIAL HYPERTENSION: ICD-10-CM

## 2025-05-12 DIAGNOSIS — E11.22 TYPE 2 DIABETES MELLITUS WITH STAGE 3A CHRONIC KIDNEY DISEASE, WITHOUT LONG-TERM CURRENT USE OF INSULIN (H): ICD-10-CM

## 2025-05-12 DIAGNOSIS — N18.31 TYPE 2 DIABETES MELLITUS WITH STAGE 3A CHRONIC KIDNEY DISEASE, WITHOUT LONG-TERM CURRENT USE OF INSULIN (H): ICD-10-CM

## 2025-05-12 DIAGNOSIS — K21.9 GASTROESOPHAGEAL REFLUX DISEASE WITHOUT ESOPHAGITIS: ICD-10-CM

## 2025-05-12 DIAGNOSIS — R63.4 WEIGHT LOSS: Primary | ICD-10-CM

## 2025-05-12 DIAGNOSIS — N18.31 STAGE 3A CHRONIC KIDNEY DISEASE (H): ICD-10-CM

## 2025-05-12 PROCEDURE — 99309 SBSQ NF CARE MODERATE MDM 30: CPT | Performed by: NURSE PRACTITIONER

## 2025-05-12 NOTE — LETTER
5/12/2025      Román Houser  [facility]  [address]  [place] [address]        [facility] GERIATRICS  Chief Complaint   Patient presents with     residential Regulatory     [facility] Medical Record Number:  [id number]  Place of Service where encounter took place:  [facility] () [[id number]]    HPI:    Román Houser  is 79 year old (1945), who is being seen today for a federally mandated E/M visit.     Today's concerns are:  Weight loss  Type 2 diabetes mellitus with stage 3a chronic kidney disease, without long-term current use of insulin (H)  Benign essential hypertension  Stage 3a chronic kidney disease (H)  Dementia without behavioral disturbance, psychotic disturbance, mood disturbance, or anxiety, unspecified dementia severity, unspecified dementia type (H)  Gastroesophageal reflux disease without esophagitis  Patient is a limited historian due to dementia. He denies all ROS questions.  He was taken off Trulicity a year ago due to weight loss. He was restarted on the lowest dose 1/2025 due to A1c 9.3%. Pharmacy appears to have substituted liraglutide. He has again lost weight, about 10 lbs, and been noted to be eating less.   Wt Readings from Last 4 Encounters:   05/12/25 73 kg (161 lb)   03/20/25 75.4 kg (166 lb 3.2 oz)   01/13/25 77.7 kg (171 lb 3.2 oz)   11/12/24 76.1 kg (167 lb 12.8 oz)         ALLERGIES:Patient has no known allergies.  PAST MEDICAL HISTORY:   Past Medical History:   Diagnosis Date     AAA (abdominal aortic aneurysm)      Bilateral carpal tunnel syndrome      Cataract      Depression      Epiretinal membrane      ETOH abuse      Hip pain      Psoriasis      PTSD (post-traumatic stress disorder)      Sleep apnea      PAST SURGICAL HISTORY:   has a past surgical history that includes CE IOL LEFT EYE.  FAMILY HISTORY: family history is not on file.  SOCIAL HISTORY:  reports that he has quit smoking. He does not have any smokeless tobacco history on  "file.    MEDICATIONS:  Current Outpatient Medications   Medication Sig Dispense Refill     acetaminophen (TYLENOL) 500 MG tablet Take 1,000 mg by mouth 2 times daily       amLODIPine (NORVASC) 2.5 MG tablet Take 2.5 mg by mouth daily       aspirin 81 MG EC tablet Take 81 mg by mouth daily       atorvastatin (LIPITOR) 10 MG tablet Take 1 tablet (10 mg) by mouth daily.       gabapentin (NEURONTIN) 300 MG capsule Take 300 mg by mouth 2 times daily       gemfibrozil (LOPID) 600 MG tablet TAKE 1 TABLET (600 MG) BY MOUTH 2 TIMES DAILY (BEFORE MEALS) 60 tablet 10     Lidocaine (LIDOCARE) 4 % Patch Place 2 patches onto the skin every 24 hours. To prevent lidocaine toxicity, patient should be patch free for 12 hrs daily. APPLY TO LOWER BACK       liraglutide (VICTOZA) 18 MG/3ML solution Inject 0.6 mg subcutaneously daily.       losartan (COZAAR) 100 MG tablet Take 100 mg by mouth daily       metFORMIN (GLUCOPHAGE) 1000 MG tablet Take 1,000 mg by mouth 2 times daily (with meals)       ondansetron (ZOFRAN) 4 MG tablet Take 4 mg by mouth every 6 hours as needed for nausea       polyethylene glycol (MIRALAX) 17 g packet Take 17 g by mouth daily as needed for constipation       sennosides (SENOKOT) 8.6 MG tablet Take 2 tablets by mouth daily as needed for constipation       thiamine (B-1) 100 MG tablet Take 100 mg by mouth daily       triamcinolone (KENALOG) 0.1 % external cream Apply topically 2 times daily APPLY TO RIGHT KNEE       venlafaxine (EFFEXOR) 50 MG tablet Take 75 mg by mouth daily           Case Management:  I have reviewed the care plan and MDS and do agree with the plan. Information reviewed:  Medications, vital signs, orders, and nursing notes.    ROS:  Limited secondary to cognitive impairment but today pt reports 4 point ROS including Respiratory, CV, GI and , other than that noted in the HPI,  is negative    Vitals:  /71   Pulse 67   Temp 98.6  F (37  C)   Resp 18   Ht 1.753 m (5' 9\")   Wt 73 kg " (161 lb)   SpO2 97%   BMI 23.78 kg/m    Body mass index is 23.78 kg/m .  Exam:  GENERAL APPEARANCE:  Alert, in no distress  EYES:  EOM normal, conjunctiva and lids normal  RESP:  no respiratory distress  CV:  no edema  ABDOMEN:  soft, non-tender  NEURO:   Cranial nerves 2-12 are normal tested and grossly at patient's baseline  PSYCH:  insight and judgement impaired, memory impaired     Lab/Diagnostic data:   Recent labs in [facility] reviewed by me today.     ASSESSMENT/PLAN  (R63.4) Weight loss  (primary encounter diagnosis)  Comment: Not severe, but due to concern for further weight loss and malnutrition, will stop GLP-1 medication    (E11.22,  N18.31) Type 2 diabetes mellitus with stage 3a chronic kidney disease, without long-term current use of insulin (H)  Comment: Currently with excellent control, but risks of GLP-1 outweigh benefits. Expect he will start getting elevated BG again. Will start glargine insulin when needed. HgA1c <8% not recommended in elderly due to risk of hypoglycemia. Risk outweighs benefit of tighter control.     (I10) Benign essential hypertension  Comment: Chronic, well controlled  Plan: Continue current POC with no changes at this time and adjustments as needed.    (N18.31) Stage 3a chronic kidney disease (H)  Comment: Noted that patient had hyperkalemia on labs in January. Often the facility labs show falsely high K levels due to lag time between blood draw and running labs, however his K has been borderline elevated for some time. Will recheck BMP. May need to reduce losartan.   Plan: BMP next lab draw. Adjust medication as clinically indicated.    (F03.90) Dementia without behavioral disturbance, psychotic disturbance, mood disturbance, or anxiety, unspecified dementia severity, unspecified dementia type (H)  Comment: Chronic, progressive. Requires 24 hour skilled nursing care. HPI/ROS difficult to obtain with cognitive impairment. Expect further functional and cognitive decline.   Plan:  Continue 24 hour care. Monitor functional status. Monitor for behavioral disturbances.    (K21.9) Gastroesophageal reflux disease without esophagitis  Comment: PPI was stopped several weeks ago. It appears he did take 1 prn dose and a few doses of zofran for nausea. It is possible, due to his dementia, that he has more frequent nausea than he is reporting. Will continue to monitor for now. May need to restart PPI      Orders:  Discontinue liraglutide  BMP, A1c 5/14/25    Electronically signed by:  LONG Becerra CNP            Sincerely,        LONG Becerra CNP    Electronically signed

## 2025-05-12 NOTE — PROGRESS NOTES
[facility] GERIATRICS  Chief Complaint   Patient presents with    intermediate Regulatory     [place] Medical Record Number:  [id number]  Place of Service where encounter took place:  [facility] () [[id number]]    HPI:    Román Houser  is 79 year old (1945), who is being seen today for a federally mandated E/M visit.     Today's concerns are:  Weight loss  Type 2 diabetes mellitus with stage 3a chronic kidney disease, without long-term current use of insulin (H)  Benign essential hypertension  Stage 3a chronic kidney disease (H)  Dementia without behavioral disturbance, psychotic disturbance, mood disturbance, or anxiety, unspecified dementia severity, unspecified dementia type (H)  Gastroesophageal reflux disease without esophagitis  Patient is a limited historian due to dementia. He denies all ROS questions.  He was taken off Trulicity a year ago due to weight loss. He was restarted on the lowest dose 1/2025 due to A1c 9.3%. Pharmacy appears to have substituted liraglutide. He has again lost weight, about 10 lbs, and been noted to be eating less.   Wt Readings from Last 4 Encounters:   05/12/25 73 kg (161 lb)   03/20/25 75.4 kg (166 lb 3.2 oz)   01/13/25 77.7 kg (171 lb 3.2 oz)   11/12/24 76.1 kg (167 lb 12.8 oz)         ALLERGIES:Patient has no known allergies.  PAST MEDICAL HISTORY:   Past Medical History:   Diagnosis Date    AAA (abdominal aortic aneurysm)     Bilateral carpal tunnel syndrome     Cataract     Depression     Epiretinal membrane     ETOH abuse     Hip pain     Psoriasis     PTSD (post-traumatic stress disorder)     Sleep apnea      PAST SURGICAL HISTORY:   has a past surgical history that includes CE IOL LEFT EYE.  FAMILY HISTORY: family history is not on file.  SOCIAL HISTORY:  reports that he has quit smoking. He does not have any smokeless tobacco history on file.    MEDICATIONS:  Current Outpatient Medications   Medication Sig Dispense Refill    acetaminophen (TYLENOL)  "500 MG tablet Take 1,000 mg by mouth 2 times daily      amLODIPine (NORVASC) 2.5 MG tablet Take 2.5 mg by mouth daily      aspirin 81 MG EC tablet Take 81 mg by mouth daily      atorvastatin (LIPITOR) 10 MG tablet Take 1 tablet (10 mg) by mouth daily.      gabapentin (NEURONTIN) 300 MG capsule Take 300 mg by mouth 2 times daily      gemfibrozil (LOPID) 600 MG tablet TAKE 1 TABLET (600 MG) BY MOUTH 2 TIMES DAILY (BEFORE MEALS) 60 tablet 10    Lidocaine (LIDOCARE) 4 % Patch Place 2 patches onto the skin every 24 hours. To prevent lidocaine toxicity, patient should be patch free for 12 hrs daily. APPLY TO LOWER BACK      liraglutide (VICTOZA) 18 MG/3ML solution Inject 0.6 mg subcutaneously daily.      losartan (COZAAR) 100 MG tablet Take 100 mg by mouth daily      metFORMIN (GLUCOPHAGE) 1000 MG tablet Take 1,000 mg by mouth 2 times daily (with meals)      ondansetron (ZOFRAN) 4 MG tablet Take 4 mg by mouth every 6 hours as needed for nausea      polyethylene glycol (MIRALAX) 17 g packet Take 17 g by mouth daily as needed for constipation      sennosides (SENOKOT) 8.6 MG tablet Take 2 tablets by mouth daily as needed for constipation      thiamine (B-1) 100 MG tablet Take 100 mg by mouth daily      triamcinolone (KENALOG) 0.1 % external cream Apply topically 2 times daily APPLY TO RIGHT KNEE      venlafaxine (EFFEXOR) 50 MG tablet Take 75 mg by mouth daily           Case Management:  I have reviewed the care plan and MDS and do agree with the plan. Information reviewed:  Medications, vital signs, orders, and nursing notes.    ROS:  Limited secondary to cognitive impairment but today pt reports 4 point ROS including Respiratory, CV, GI and , other than that noted in the HPI,  is negative    Vitals:  /71   Pulse 67   Temp 98.6  F (37  C)   Resp 18   Ht 1.753 m (5' 9\")   Wt 73 kg (161 lb)   SpO2 97%   BMI 23.78 kg/m    Body mass index is 23.78 kg/m .  Exam:  GENERAL APPEARANCE:  Alert, in no distress  EYES:  " EOM normal, conjunctiva and lids normal  RESP:  no respiratory distress  CV:  no edema  ABDOMEN:  soft, non-tender  NEURO:   Cranial nerves 2-12 are normal tested and grossly at patient's baseline  PSYCH:  insight and judgement impaired, memory impaired     Lab/Diagnostic data:   Recent labs in [facility] reviewed by me today.     ASSESSMENT/PLAN  (R63.4) Weight loss  (primary encounter diagnosis)  Comment: Not severe, but due to concern for further weight loss and malnutrition, will stop GLP-1 medication    (E11.22,  N18.31) Type 2 diabetes mellitus with stage 3a chronic kidney disease, without long-term current use of insulin (H)  Comment: Currently with excellent control, but risks of GLP-1 outweigh benefits. Expect he will start getting elevated BG again. Will start glargine insulin when needed. HgA1c <8% not recommended in elderly due to risk of hypoglycemia. Risk outweighs benefit of tighter control.     (I10) Benign essential hypertension  Comment: Chronic, well controlled  Plan: Continue current POC with no changes at this time and adjustments as needed.    (N18.31) Stage 3a chronic kidney disease (H)  Comment: Noted that patient had hyperkalemia on labs in January. Often the facility labs show falsely high K levels due to lag time between blood draw and running labs, however his K has been borderline elevated for some time. Will recheck BMP. May need to reduce losartan.   Plan: BMP next lab draw. Adjust medication as clinically indicated.    (F03.90) Dementia without behavioral disturbance, psychotic disturbance, mood disturbance, or anxiety, unspecified dementia severity, unspecified dementia type (H)  Comment: Chronic, progressive. Requires 24 hour skilled nursing care. HPI/ROS difficult to obtain with cognitive impairment. Expect further functional and cognitive decline.   Plan: Continue 24 hour care. Monitor functional status. Monitor for behavioral disturbances.    (K21.9) Gastroesophageal reflux disease  without esophagitis  Comment: PPI was stopped several weeks ago. It appears he did take 1 prn dose and a few doses of zofran for nausea. It is possible, due to his dementia, that he has more frequent nausea than he is reporting. Will continue to monitor for now. May need to restart PPI      Orders:  Discontinue liraglutide  BMP, A1c 5/14/25    Electronically signed by:  LONG Becerra CNP

## 2025-05-13 ENCOUNTER — LAB REQUISITION (OUTPATIENT)
Dept: LAB | Facility: CLINIC | Age: 80
End: 2025-05-13
Payer: COMMERCIAL

## 2025-05-13 DIAGNOSIS — R63.4 ABNORMAL WEIGHT LOSS: ICD-10-CM

## 2025-05-14 ENCOUNTER — RESULTS FOLLOW-UP (OUTPATIENT)
Dept: GERIATRICS | Facility: CLINIC | Age: 80
End: 2025-05-14

## 2025-05-14 LAB
ANION GAP SERPL CALCULATED.3IONS-SCNC: 17 MMOL/L (ref 7–15)
BUN SERPL-MCNC: 40.1 MG/DL (ref 8–23)
CALCIUM SERPL-MCNC: 10.2 MG/DL (ref 8.8–10.4)
CHLORIDE SERPL-SCNC: 105 MMOL/L (ref 98–107)
CREAT SERPL-MCNC: 1.18 MG/DL (ref 0.67–1.17)
EGFRCR SERPLBLD CKD-EPI 2021: 62 ML/MIN/1.73M2
ERYTHROCYTE [DISTWIDTH] IN BLOOD BY AUTOMATED COUNT: 14.6 % (ref 10–15)
EST. AVERAGE GLUCOSE BLD GHB EST-MCNC: 174 MG/DL
GLUCOSE SERPL-MCNC: 139 MG/DL (ref 70–99)
HBA1C MFR BLD: 7.7 %
HCO3 SERPL-SCNC: 17 MMOL/L (ref 22–29)
HCT VFR BLD AUTO: 40.4 % (ref 40–53)
HGB BLD-MCNC: 12.7 G/DL (ref 13.3–17.7)
MCH RBC QN AUTO: 31.1 PG (ref 26.5–33)
MCHC RBC AUTO-ENTMCNC: 31.4 G/DL (ref 31.5–36.5)
MCV RBC AUTO: 99 FL (ref 78–100)
PLATELET # BLD AUTO: 364 10E3/UL (ref 150–450)
POTASSIUM SERPL-SCNC: 4.7 MMOL/L (ref 3.4–5.3)
RBC # BLD AUTO: 4.09 10E6/UL (ref 4.4–5.9)
SODIUM SERPL-SCNC: 139 MMOL/L (ref 135–145)
WBC # BLD AUTO: 7.8 10E3/UL (ref 4–11)

## 2025-05-14 PROCEDURE — 84520 ASSAY OF UREA NITROGEN: CPT | Performed by: INTERNAL MEDICINE

## 2025-05-14 PROCEDURE — P9603 ONE-WAY ALLOW PRORATED MILES: HCPCS | Performed by: INTERNAL MEDICINE

## 2025-05-14 PROCEDURE — 83036 HEMOGLOBIN GLYCOSYLATED A1C: CPT | Performed by: INTERNAL MEDICINE

## 2025-05-14 PROCEDURE — 36415 COLL VENOUS BLD VENIPUNCTURE: CPT | Performed by: INTERNAL MEDICINE

## 2025-05-14 PROCEDURE — 85027 COMPLETE CBC AUTOMATED: CPT | Performed by: INTERNAL MEDICINE

## 2025-06-17 ENCOUNTER — NURSING HOME VISIT (OUTPATIENT)
Dept: GERIATRICS | Facility: CLINIC | Age: 80
End: 2025-06-17

## 2025-06-17 ENCOUNTER — LAB REQUISITION (OUTPATIENT)
Dept: LAB | Facility: CLINIC | Age: 80
End: 2025-06-17

## 2025-06-17 VITALS
BODY MASS INDEX: 23.96 KG/M2 | TEMPERATURE: 97.9 F | HEART RATE: 60 BPM | OXYGEN SATURATION: 100 % | DIASTOLIC BLOOD PRESSURE: 67 MMHG | WEIGHT: 161.8 LBS | HEIGHT: 69 IN | SYSTOLIC BLOOD PRESSURE: 129 MMHG | RESPIRATION RATE: 18 BRPM

## 2025-06-17 DIAGNOSIS — N18.31 STAGE 3A CHRONIC KIDNEY DISEASE (H): ICD-10-CM

## 2025-06-17 DIAGNOSIS — E78.5 HYPERLIPIDEMIA, UNSPECIFIED: ICD-10-CM

## 2025-06-17 DIAGNOSIS — E78.2 MIXED HYPERLIPIDEMIA: ICD-10-CM

## 2025-06-17 DIAGNOSIS — F32.A DEPRESSION, UNSPECIFIED DEPRESSION TYPE: ICD-10-CM

## 2025-06-17 DIAGNOSIS — E11.65 TYPE 2 DIABETES MELLITUS WITH HYPERGLYCEMIA, WITHOUT LONG-TERM CURRENT USE OF INSULIN (H): Primary | ICD-10-CM

## 2025-06-17 RX ORDER — VENLAFAXINE 50 MG/1
50 TABLET ORAL DAILY
Status: SHIPPED
Start: 2025-06-17

## 2025-06-17 RX ORDER — ATORVASTATIN CALCIUM 40 MG/1
40 TABLET, FILM COATED ORAL DAILY
Status: SHIPPED
Start: 2025-06-17

## 2025-06-17 RX ORDER — DAPAGLIFLOZIN 5 MG/1
5 TABLET, FILM COATED ORAL DAILY
Status: SHIPPED
Start: 2025-06-17

## 2025-06-17 NOTE — LETTER
" 6/17/2025      Román Houser  [facility]  [address]  [facility] [address]        [facility] GERIATRICS    Chief Complaint   Patient presents with     RECHECK     HPI:  Román Houser is a 79 year old  (1945), who is being seen today for an episodic care visit at: [facility] () [[id number]].     Today's concern is:   Type 2 diabetes mellitus with hyperglycemia, without long-term current use of insulin (H)  GLP-1 has been attempted twice in the past few years, but even at the lowest dose he had too significant of weight loss and reduced appetite. He is currently on metformin alone. Weight is stable. Blood sugars 150-260s.     Stage 3a chronic kidney disease (H)  See labs    Mixed hyperlipidemia  In reviewing chart more closely and considering medication changes, it appears that he was on a higher dose of atorvastatin in the past. Previous PCP tapered it down, the reason seems to just be polypharmacy, medication reduction. He has not complained of any musculoskeletal pain.     Depression, unspecified depression type  Order given 6/3/25 to reduce venlafaxine to 50mg due to low PHQ9 scores.       Allergies, and PMH/PSH reviewed in EPIC today.  REVIEW OF SYSTEMS:  Limited secondary to cognitive impairment but today pt reports 4 point ROS including Respiratory, CV, GI and , other than that noted in the HPI,  is negative    Objective:   /67   Pulse 60   Temp 97.9  F (36.6  C)   Resp 18   Ht 1.753 m (5' 9\")   Wt 73.4 kg (161 lb 12.8 oz)   SpO2 100%   BMI 23.89 kg/m    GENERAL APPEARANCE:  Alert, in no distress  RESP:  no respiratory distress  CV:  regular rate and rhythm, no murmur, rub, or gallop, no edema  PSYCH:  insight and judgement impaired, memory impaired , affect and mood normal    Recent labs in [facility] reviewed by me today.     Assessment/Plan:  (E11.65) Type 2 diabetes mellitus with hyperglycemia, without long-term current use of insulin (H)  (primary " encounter diagnosis)  Comment: Control is fair. Patient's goals of care remain restorative. Would prefer to see tight control. Goal A1c around 7.5%. After review of past record and current recommendations, adding an SGLT2 would be appropriate given his CKD.   Plan: dapagliflozin 5mg daily. Monitor BG. Recheck A1c in 3 months    (N18.31) Stage 3a chronic kidney disease (H)  Comment: Chronic Kidney Disease due to: Diabetes .  Plan: Avoid nephrotoxic medications and adjust medications per renal function. Refer to plan of care for Diabetes .    (E78.2) Mixed hyperlipidemia  Comment: In reviewing available literature, gemfibrozil is not shown to improve outcomes with ASCVD. Increasing his statin to a higher intensity will be more effective in lower his triglycerides and improving HDL. He has not had any known side effects to the atorvastatin. Expect he has been on the gemfibrozil for many years without review  Plan: Discontinue gemfibrozil. Increase atorvastatin to 40mg daily. Check lipid panel now and in 6 months.     (F32.A) Depression, unspecified depression type  Comment: Minimal symptoms of depression. Previous GDR was documented as failed due to weight loss and reduced intake, but this was later determined to be due to GLP-1. Expect he will tolerate the decrease well      Orders:  Dapagliflozin 5mg daily  Discontinue gemfibrozil  Increase atorvastatin to 40mg daily  Check lipid panel      Total time spent with patient visit was 45 min including patient visit and review of past records.     Electronically signed by: LONG Becerra CNP           Sincerely,        LONG Becerra CNP    Electronically signed

## 2025-06-17 NOTE — PROGRESS NOTES
"[facility] GERIATRICS    Chief Complaint   Patient presents with    RECHECK     HPI:  Román Houser is a 79 year old  (1945), who is being seen today for an episodic care visit at: [facility] () [[id number]].     Today's concern is:   Type 2 diabetes mellitus with hyperglycemia, without long-term current use of insulin (H)  GLP-1 has been attempted twice in the past few years, but even at the lowest dose he had too significant of weight loss and reduced appetite. He is currently on metformin alone. Weight is stable. Blood sugars 150-260s.     Stage 3a chronic kidney disease (H)  See labs    Mixed hyperlipidemia  In reviewing chart more closely and considering medication changes, it appears that he was on a higher dose of atorvastatin in the past. Previous PCP tapered it down, the reason seems to just be polypharmacy, medication reduction. He has not complained of any musculoskeletal pain.     Depression, unspecified depression type  Order given 6/3/25 to reduce venlafaxine to 50mg due to low PHQ9 scores.       Allergies, and PMH/PSH reviewed in [facility] today.  REVIEW OF SYSTEMS:  Limited secondary to cognitive impairment but today pt reports 4 point ROS including Respiratory, CV, GI and , other than that noted in the HPI,  is negative    Objective:   /67   Pulse 60   Temp 97.9  F (36.6  C)   Resp 18   Ht 1.753 m (5' 9\")   Wt 73.4 kg (161 lb 12.8 oz)   SpO2 100%   BMI 23.89 kg/m    GENERAL APPEARANCE:  Alert, in no distress  RESP:  no respiratory distress  CV:  regular rate and rhythm, no murmur, rub, or gallop, no edema  PSYCH:  insight and judgement impaired, memory impaired , affect and mood normal    Recent labs in [facility] reviewed by me today.     Assessment/Plan:  (E11.65) Type 2 diabetes mellitus with hyperglycemia, without long-term current use of insulin (H)  (primary encounter diagnosis)  Comment: Control is fair. Patient's goals of care remain restorative. Would prefer to see " tight control. Goal A1c around 7.5%. After review of past record and current recommendations, adding an SGLT2 would be appropriate given his CKD.   Plan: dapagliflozin 5mg daily. Monitor BG. Recheck A1c in 3 months    (N18.31) Stage 3a chronic kidney disease (H)  Comment: Chronic Kidney Disease due to: Diabetes .  Plan: Avoid nephrotoxic medications and adjust medications per renal function. Refer to plan of care for Diabetes .    (E78.2) Mixed hyperlipidemia  Comment: In reviewing available literature, gemfibrozil is not shown to improve outcomes with ASCVD. Increasing his statin to a higher intensity will be more effective in lower his triglycerides and improving HDL. He has not had any known side effects to the atorvastatin. Expect he has been on the gemfibrozil for many years without review  Plan: Discontinue gemfibrozil. Increase atorvastatin to 40mg daily. Check lipid panel now and in 6 months.     (F32.A) Depression, unspecified depression type  Comment: Minimal symptoms of depression. Previous GDR was documented as failed due to weight loss and reduced intake, but this was later determined to be due to GLP-1. Expect he will tolerate the decrease well      Orders:  Dapagliflozin 5mg daily  Discontinue gemfibrozil  Increase atorvastatin to 40mg daily  Check lipid panel      Total time spent with patient visit was 45 min including patient visit and review of past records.     Electronically signed by: LONG Becerra CNP

## 2025-06-18 ENCOUNTER — RESULTS FOLLOW-UP (OUTPATIENT)
Dept: GERIATRICS | Facility: CLINIC | Age: 80
End: 2025-06-18

## 2025-06-18 LAB
CHOLEST SERPL-MCNC: 150 MG/DL
FASTING STATUS PATIENT QL REPORTED: ABNORMAL
HDLC SERPL-MCNC: 28 MG/DL
LDLC SERPL CALC-MCNC: 74 MG/DL
NONHDLC SERPL-MCNC: 122 MG/DL
TRIGL SERPL-MCNC: 240 MG/DL

## 2025-06-18 PROCEDURE — 83718 ASSAY OF LIPOPROTEIN: CPT | Performed by: INTERNAL MEDICINE

## 2025-06-18 PROCEDURE — 36415 COLL VENOUS BLD VENIPUNCTURE: CPT | Performed by: INTERNAL MEDICINE

## 2025-06-18 PROCEDURE — P9604 ONE-WAY ALLOW PRORATED TRIP: HCPCS | Performed by: INTERNAL MEDICINE

## 2025-06-18 PROCEDURE — 82465 ASSAY BLD/SERUM CHOLESTEROL: CPT | Performed by: INTERNAL MEDICINE

## 2025-07-09 ENCOUNTER — NURSING HOME VISIT (OUTPATIENT)
Dept: GERIATRICS | Facility: CLINIC | Age: 80
End: 2025-07-09
Payer: COMMERCIAL

## 2025-07-09 VITALS
OXYGEN SATURATION: 99 % | HEIGHT: 69 IN | TEMPERATURE: 97.7 F | BODY MASS INDEX: 24.38 KG/M2 | HEART RATE: 60 BPM | WEIGHT: 164.6 LBS | DIASTOLIC BLOOD PRESSURE: 63 MMHG | SYSTOLIC BLOOD PRESSURE: 136 MMHG | RESPIRATION RATE: 18 BRPM

## 2025-07-09 RX ORDER — ONDANSETRON 4 MG/1
TABLET, FILM COATED ORAL EVERY 6 HOURS PRN
COMMUNITY

## 2025-07-09 RX ORDER — KETOCONAZOLE 20 MG/ML
SHAMPOO, SUSPENSION TOPICAL WEEKLY
COMMUNITY

## 2025-07-09 NOTE — LETTER
7/9/2025      Román Houser  [facility]  [address]  [facility] [address]        No notes on file      Sincerely,        Mona Davalos MD    Electronically signed

## 2025-07-09 NOTE — PROGRESS NOTES
[facility] GERIATRICS  REGULATORY VISIT  July 9, 2025      [facility] Medical Record Number:  [id number]  Place of Service where encounter took place:  [facility] () [[id number]]    Chief Complaint   Patient presents with    Wellness Visit    Nursing Home Regulatory       HPI:    Román Houser is a 80 year old  (1945), who is being seen today for a federally mandated E/M visit. HPI information obtained from: {FGS HPI:325254}.    Today, ***      ALLERGIES:  No Known Allergies     Past Medical, Surgical, Family and Social History: Reviewed and updated in EPIC.    MEDICATIONS:  Post Discharge Medication Reconciliation Status: {ACO Med Rec (Provider):832674}. ***    Current Outpatient Medications   Medication Sig Dispense Refill    acetaminophen (TYLENOL) 500 MG tablet Take 1,000 mg by mouth 2 times daily      amLODIPine (NORVASC) 2.5 MG tablet Take 2.5 mg by mouth daily      aspirin 81 MG EC tablet Take 81 mg by mouth daily      atorvastatin (LIPITOR) 40 MG tablet Take 1 tablet (40 mg) by mouth daily.      dapagliflozin (FARXIGA) 5 MG TABS tablet Take 1 tablet (5 mg) by mouth daily.      gabapentin (NEURONTIN) 300 MG capsule Take 300 mg by mouth 2 times daily      ketoconazole (NIZORAL) 2 % external shampoo Apply topically once a week.      Lidocaine (LIDOCARE) 4 % Patch Place 2 patches onto the skin every 24 hours. To prevent lidocaine toxicity, patient should be patch free for 12 hrs daily. APPLY TO LOWER BACK      losartan (COZAAR) 100 MG tablet Take 100 mg by mouth daily      metFORMIN (GLUCOPHAGE) 1000 MG tablet Take 1,000 mg by mouth 2 times daily (with meals)      ondansetron (ZOFRAN) 4 MG tablet Take by mouth every 6 hours as needed for nausea.      polyethylene glycol (MIRALAX) 17 g packet Take 17 g by mouth daily as needed for constipation      sennosides (SENOKOT) 8.6 MG tablet Take 2 tablets by mouth daily as needed for constipation      thiamine (B-1) 100 MG tablet Take 100 mg  "by mouth daily      triamcinolone (KENALOG) 0.1 % external cream Apply topically 2 times daily APPLY TO RIGHT KNEE      venlafaxine (EFFEXOR) 50 MG tablet Take 1 tablet (50 mg) by mouth daily.       Medications reviewed:  Medications reconciled to facility chart and changes were made to reflect current medications as identified as above med list. Below are the changes that were made:   Medications stopped since last EPIC medication reconciliation:   Medications Discontinued During This Encounter   Medication Reason    ondansetron (ZOFRAN) 4 MG tablet Therapy completed (No AVS)     Medications started since last [facility] medication reconciliation:  Orders Placed This Encounter   Medications    ketoconazole (NIZORAL) 2 % external shampoo     Sig: Apply topically once a week.    ondansetron (ZOFRAN) 4 MG tablet     Sig: Take by mouth every 6 hours as needed for nausea.     ***    REVIEW OF SYSTEMS:  4 point ROS neg other than the symptoms noted above in the HPI.***  Unable to be obtained due to cognitive impairment or aphasia.     PHYSICAL EXAM:  /63   Pulse 60   Temp 97.7  F (36.5  C)   Resp 18   Ht 1.753 m (5' 9\")   Wt 74.7 kg (164 lb 9.6 oz)   SpO2 99%   BMI 24.31 kg/m    Gen: sitting in bed ***wheelchair, alert, cooperative and in no acute distress  HEENT: *** hearing acuity  Card: RRR, S1, S2, no murmurs  Resp: lungs clear to auscultation bilaterally ***anteriorly and laterally, no crackles or wheezes; moving good air  GI: abdomen soft, not-tender, non-distended, +BS  Ext: no LE edema  Neuro: CX II-XII grossly in tact; ROM in all four extremities grossly in tact  Psych: alert and oriented x3; normal affect ***memory, judgement and insight impaired ***alert and oriented to self and general situation    LABS/IMAGING: Reviewed as per Epic and/or [facility]    ASSESSMENT / PLAN:  {FGS DX:415516}    Orders:  ***    Electronically signed by  Mona Davalos MD          "

## 2025-08-05 ENCOUNTER — LAB REQUISITION (OUTPATIENT)
Dept: LAB | Facility: CLINIC | Age: 80
End: 2025-08-05

## 2025-08-05 ENCOUNTER — NURSING HOME VISIT (OUTPATIENT)
Dept: GERIATRICS | Facility: CLINIC | Age: 80
End: 2025-08-05

## 2025-08-05 VITALS
HEIGHT: 69 IN | OXYGEN SATURATION: 98 % | TEMPERATURE: 97.6 F | BODY MASS INDEX: 24.82 KG/M2 | WEIGHT: 167.6 LBS | RESPIRATION RATE: 18 BRPM | SYSTOLIC BLOOD PRESSURE: 132 MMHG | HEART RATE: 53 BPM | DIASTOLIC BLOOD PRESSURE: 67 MMHG

## 2025-08-05 DIAGNOSIS — F32.A DEPRESSION, UNSPECIFIED DEPRESSION TYPE: ICD-10-CM

## 2025-08-05 DIAGNOSIS — E11.40 TYPE 2 DIABETES MELLITUS WITH DIABETIC NEUROPATHY, UNSPECIFIED (H): ICD-10-CM

## 2025-08-05 DIAGNOSIS — N18.31 STAGE 3A CHRONIC KIDNEY DISEASE (H): ICD-10-CM

## 2025-08-05 DIAGNOSIS — E11.65 TYPE 2 DIABETES MELLITUS WITH HYPERGLYCEMIA, WITHOUT LONG-TERM CURRENT USE OF INSULIN (H): Primary | ICD-10-CM

## 2025-08-05 DIAGNOSIS — F10.27: ICD-10-CM

## 2025-08-05 DIAGNOSIS — N18.30 CHRONIC KIDNEY DISEASE, STAGE 3 UNSPECIFIED (H): ICD-10-CM

## 2025-08-05 RX ORDER — VENLAFAXINE 75 MG/1
75 TABLET ORAL DAILY
Status: SHIPPED
Start: 2025-08-05

## 2025-08-06 LAB
ANION GAP SERPL CALCULATED.3IONS-SCNC: 13 MMOL/L (ref 7–15)
BUN SERPL-MCNC: 36.6 MG/DL (ref 8–23)
CALCIUM SERPL-MCNC: 9.5 MG/DL (ref 8.8–10.4)
CHLORIDE SERPL-SCNC: 105 MMOL/L (ref 98–107)
CREAT SERPL-MCNC: 1.21 MG/DL (ref 0.67–1.17)
EGFRCR SERPLBLD CKD-EPI 2021: 60 ML/MIN/1.73M2
EST. AVERAGE GLUCOSE BLD GHB EST-MCNC: 197 MG/DL
GLUCOSE SERPL-MCNC: 141 MG/DL (ref 70–99)
HBA1C MFR BLD: 8.5 %
HCO3 SERPL-SCNC: 22 MMOL/L (ref 22–29)
POTASSIUM SERPL-SCNC: 5.1 MMOL/L (ref 3.4–5.3)
SODIUM SERPL-SCNC: 140 MMOL/L (ref 135–145)

## 2025-08-06 PROCEDURE — 83036 HEMOGLOBIN GLYCOSYLATED A1C: CPT | Performed by: INTERNAL MEDICINE

## 2025-08-06 PROCEDURE — P9603 ONE-WAY ALLOW PRORATED MILES: HCPCS | Performed by: INTERNAL MEDICINE

## 2025-08-06 PROCEDURE — 36415 COLL VENOUS BLD VENIPUNCTURE: CPT | Performed by: INTERNAL MEDICINE

## 2025-08-06 PROCEDURE — 80048 BASIC METABOLIC PNL TOTAL CA: CPT | Performed by: INTERNAL MEDICINE
